# Patient Record
Sex: FEMALE | Race: WHITE | NOT HISPANIC OR LATINO | Employment: FULL TIME | URBAN - METROPOLITAN AREA
[De-identification: names, ages, dates, MRNs, and addresses within clinical notes are randomized per-mention and may not be internally consistent; named-entity substitution may affect disease eponyms.]

---

## 2018-01-15 ENCOUNTER — APPOINTMENT (OUTPATIENT)
Dept: WOMENS IMAGING | Facility: HOSPITAL | Age: 43
End: 2018-01-15

## 2018-01-15 PROCEDURE — 77067 SCR MAMMO BI INCL CAD: CPT | Performed by: RADIOLOGY

## 2018-06-05 ENCOUNTER — OFFICE VISIT (OUTPATIENT)
Dept: INTERNAL MEDICINE | Facility: CLINIC | Age: 43
End: 2018-06-05

## 2018-06-05 VITALS
SYSTOLIC BLOOD PRESSURE: 114 MMHG | TEMPERATURE: 98.7 F | HEART RATE: 73 BPM | OXYGEN SATURATION: 99 % | HEIGHT: 62 IN | WEIGHT: 160.2 LBS | DIASTOLIC BLOOD PRESSURE: 68 MMHG | BODY MASS INDEX: 29.48 KG/M2

## 2018-06-05 DIAGNOSIS — Z00.00 HEALTHCARE MAINTENANCE: Primary | ICD-10-CM

## 2018-06-05 DIAGNOSIS — G47.00 INSOMNIA, UNSPECIFIED TYPE: ICD-10-CM

## 2018-06-05 PROCEDURE — 99396 PREV VISIT EST AGE 40-64: CPT | Performed by: NURSE PRACTITIONER

## 2018-06-05 RX ORDER — LEVONORGESTREL AND ETHINYL ESTRADIOL 150-30(84)
KIT ORAL
COMMUNITY
Start: 2018-05-23 | End: 2021-08-20

## 2018-06-05 NOTE — PROGRESS NOTES
"Subjective   Timoteo Spear is a 43 y.o. female and is here for a comprehensive physical exam. New patient here to establish care.  Health history and questionnaire have been reviewed in its entirety. The patient's previous primary care provider was Alisa KIRBY  The patient reports fatigue.  States that she gets about 7-8 hours of sleep a night, but does not feel rested.  She has worn a fit that in the past, which shows that she is a restless sleeper.  She is not sure if she snores.    Do you take any herbs or supplements that were not prescribed by a doctor? calcium, daily vitamin, fish oil  Are you taking aspirin daily? no     History:  Patient sees gynecology. Last pap and mammogram were in Oct 2017    The following portions of the patient's history were reviewed and updated as appropriate: allergies, current medications, past family history, past medical history, past social history, past surgical history and problem list.    Review of Systems  Do you have pain that bothers you in your daily life? no  Pertinent items are noted in HPI.    Objective   /68 (BP Location: Left arm, Patient Position: Sitting)   Pulse 73   Temp 98.7 °F (37.1 °C)   Ht 157.5 cm (62\")   Wt 72.7 kg (160 lb 3.2 oz)   SpO2 99%   BMI 29.30 kg/m²     General Appearance:    Alert, cooperative, no distress, appears stated age   Head:    Normocephalic, without obvious abnormality, atraumatic   Eyes:    PERRL, conjunctiva/corneas clear, EOM's intact, fundi     benign, both eyes   Ears:    Normal TM's and external ear canals, both ears   Nose:   Nares normal, septum midline, mucosa normal, no drainage    or sinus tenderness   Throat:   Lips, mucosa, and tongue normal; teeth and gums normal   Neck:   Supple, symmetrical, trachea midline, no adenopathy;     thyroid:  no enlargement/tenderness/nodules; no carotid    bruit or JVD   Back:     Symmetric, no curvature, ROM normal, no CVA tenderness   Lungs:     Clear to auscultation " bilaterally, respirations unlabored   Chest Wall:    No tenderness or deformity    Heart:    Regular rate and rhythm, S1 and S2 normal, no murmur, rub   or gallop       Abdomen:     Soft, non-tender, bowel sounds active all four quadrants,     no masses, no organomegaly           Extremities:   Extremities normal, atraumatic, no cyanosis or edema   Pulses:   2+ and symmetric all extremities   Skin:   Skin color, texture, turgor normal, no rashes or lesions   Lymph nodes:   Cervical, supraclavicular, and axillary nodes normal   Neurologic:   CNII-XII intact, normal strength, sensation and reflexes     throughout        Assessment/Plan   Healthy female exam.       1. Timoteo was seen today for annual exam.    Diagnoses and all orders for this visit:    Healthcare maintenance  -     CBC & Differential  -     Comprehensive Metabolic Panel  -     Lipid Panel With / Chol / HDL Ratio  -     TSH  -     Vitamin D 25 Hydroxy    Insomnia, unspecified type  -     Ambulatory Referral to Sleep Medicine        2. Patient Counseling:  --Nutrition: Stressed importance of moderation in sodium/caffeine intake, saturated fat and cholesterol, caloric balance, sufficient intake of fresh fruits, vegetables, fiber, calcium, iron.   --Exercise: Stressed the importance of regular exercise. Run/jog; barre   --Injury prevention: Discussed safety belts, safety helmets, smoke detector.   --Dental health: Discussed importance of regular tooth brushing, flossing, and dental visits. Dr. Sanchez  --Immunizations reviewed.    3. Discussed the patient's BMI with her.  The BMI is above average; BMI management plan is completed  4. Follow up based on lab results

## 2018-06-11 LAB
25(OH)D3+25(OH)D2 SERPL-MCNC: 37.1 NG/ML (ref 30–100)
ALBUMIN SERPL-MCNC: 4.3 G/DL (ref 3.5–5.2)
ALBUMIN/GLOB SERPL: 1.3 G/DL
ALP SERPL-CCNC: 46 U/L (ref 39–117)
ALT SERPL-CCNC: 9 U/L (ref 1–33)
AST SERPL-CCNC: 21 U/L (ref 1–32)
BASOPHILS # BLD AUTO: 0.04 10*3/MM3 (ref 0–0.2)
BASOPHILS NFR BLD AUTO: 0.5 % (ref 0–1.5)
BILIRUB SERPL-MCNC: 0.4 MG/DL (ref 0.1–1.2)
BUN SERPL-MCNC: 15 MG/DL (ref 6–20)
BUN/CREAT SERPL: 17 (ref 7–25)
CALCIUM SERPL-MCNC: 10.2 MG/DL (ref 8.6–10.5)
CHLORIDE SERPL-SCNC: 98 MMOL/L (ref 98–107)
CHOLEST SERPL-MCNC: 199 MG/DL (ref 0–200)
CHOLEST/HDLC SERPL: 4.52 {RATIO}
CO2 SERPL-SCNC: 24.2 MMOL/L (ref 22–29)
CREAT SERPL-MCNC: 0.88 MG/DL (ref 0.57–1)
EOSINOPHIL # BLD AUTO: 0.38 10*3/MM3 (ref 0–0.7)
EOSINOPHIL NFR BLD AUTO: 5 % (ref 0.3–6.2)
ERYTHROCYTE [DISTWIDTH] IN BLOOD BY AUTOMATED COUNT: 12.9 % (ref 11.7–13)
GFR SERPLBLD CREATININE-BSD FMLA CKD-EPI: 69 ML/MIN/1.73
GFR SERPLBLD CREATININE-BSD FMLA CKD-EPI: 84 ML/MIN/1.73
GLOBULIN SER CALC-MCNC: 3.2 GM/DL
GLUCOSE SERPL-MCNC: 76 MG/DL (ref 65–99)
HCT VFR BLD AUTO: 41.3 % (ref 35.6–45.5)
HDLC SERPL-MCNC: 44 MG/DL (ref 40–60)
HGB BLD-MCNC: 13.3 G/DL (ref 11.9–15.5)
IMM GRANULOCYTES # BLD: 0 10*3/MM3 (ref 0–0.03)
IMM GRANULOCYTES NFR BLD: 0 % (ref 0–0.5)
LDLC SERPL CALC-MCNC: 127 MG/DL (ref 0–100)
LYMPHOCYTES # BLD AUTO: 2.32 10*3/MM3 (ref 0.9–4.8)
LYMPHOCYTES NFR BLD AUTO: 30.5 % (ref 19.6–45.3)
MCH RBC QN AUTO: 32 PG (ref 26.9–32)
MCHC RBC AUTO-ENTMCNC: 32.2 G/DL (ref 32.4–36.3)
MCV RBC AUTO: 99.3 FL (ref 80.5–98.2)
MONOCYTES # BLD AUTO: 0.56 10*3/MM3 (ref 0.2–1.2)
MONOCYTES NFR BLD AUTO: 7.4 % (ref 5–12)
NEUTROPHILS # BLD AUTO: 4.31 10*3/MM3 (ref 1.9–8.1)
NEUTROPHILS NFR BLD AUTO: 56.6 % (ref 42.7–76)
PLATELET # BLD AUTO: 390 10*3/MM3 (ref 140–500)
POTASSIUM SERPL-SCNC: 4.6 MMOL/L (ref 3.5–5.2)
PROT SERPL-MCNC: 7.5 G/DL (ref 6–8.5)
RBC # BLD AUTO: 4.16 10*6/MM3 (ref 3.9–5.2)
SODIUM SERPL-SCNC: 139 MMOL/L (ref 136–145)
TRIGL SERPL-MCNC: 139 MG/DL (ref 0–150)
TSH SERPL DL<=0.005 MIU/L-ACNC: 3.61 MIU/ML (ref 0.27–4.2)
VLDLC SERPL CALC-MCNC: 27.8 MG/DL (ref 5–40)
WBC # BLD AUTO: 7.61 10*3/MM3 (ref 4.5–10.7)

## 2019-01-25 ENCOUNTER — APPOINTMENT (OUTPATIENT)
Dept: WOMENS IMAGING | Facility: HOSPITAL | Age: 44
End: 2019-01-25

## 2019-01-25 PROCEDURE — 77063 BREAST TOMOSYNTHESIS BI: CPT | Performed by: RADIOLOGY

## 2019-01-25 PROCEDURE — 77067 SCR MAMMO BI INCL CAD: CPT | Performed by: RADIOLOGY

## 2019-06-21 DIAGNOSIS — E55.9 VITAMIN D DEFICIENCY: ICD-10-CM

## 2019-06-21 DIAGNOSIS — Z00.00 HEALTHCARE MAINTENANCE: Primary | ICD-10-CM

## 2019-06-28 LAB
25(OH)D3+25(OH)D2 SERPL-MCNC: 33.9 NG/ML (ref 30–100)
ALBUMIN SERPL-MCNC: 4.3 G/DL (ref 3.5–5.2)
ALBUMIN/GLOB SERPL: 1.3 G/DL
ALP SERPL-CCNC: 51 U/L (ref 39–117)
ALT SERPL-CCNC: 8 U/L (ref 1–33)
AST SERPL-CCNC: 14 U/L (ref 1–32)
BASOPHILS # BLD AUTO: 0.06 10*3/MM3 (ref 0–0.2)
BASOPHILS NFR BLD AUTO: 0.8 % (ref 0–1.5)
BILIRUB SERPL-MCNC: <0.2 MG/DL (ref 0.2–1.2)
BUN SERPL-MCNC: 15 MG/DL (ref 6–20)
BUN/CREAT SERPL: 20.5 (ref 7–25)
CALCIUM SERPL-MCNC: 9.6 MG/DL (ref 8.6–10.5)
CHLORIDE SERPL-SCNC: 102 MMOL/L (ref 98–107)
CHOLEST SERPL-MCNC: 195 MG/DL (ref 0–200)
CHOLEST/HDLC SERPL: 4.24 {RATIO}
CO2 SERPL-SCNC: 26.2 MMOL/L (ref 22–29)
CREAT SERPL-MCNC: 0.73 MG/DL (ref 0.57–1)
EOSINOPHIL # BLD AUTO: 0.32 10*3/MM3 (ref 0–0.4)
EOSINOPHIL NFR BLD AUTO: 4.4 % (ref 0.3–6.2)
ERYTHROCYTE [DISTWIDTH] IN BLOOD BY AUTOMATED COUNT: 12.5 % (ref 12.3–15.4)
GLOBULIN SER CALC-MCNC: 3.2 GM/DL
GLUCOSE SERPL-MCNC: 88 MG/DL (ref 65–99)
HCT VFR BLD AUTO: 42.2 % (ref 34–46.6)
HDLC SERPL-MCNC: 46 MG/DL (ref 40–60)
HGB BLD-MCNC: 13.2 G/DL (ref 12–15.9)
IMM GRANULOCYTES # BLD AUTO: 0.01 10*3/MM3 (ref 0–0.05)
IMM GRANULOCYTES NFR BLD AUTO: 0.1 % (ref 0–0.5)
LDLC SERPL CALC-MCNC: 128 MG/DL (ref 0–100)
LYMPHOCYTES # BLD AUTO: 2.28 10*3/MM3 (ref 0.7–3.1)
LYMPHOCYTES NFR BLD AUTO: 31.4 % (ref 19.6–45.3)
MCH RBC QN AUTO: 31.4 PG (ref 26.6–33)
MCHC RBC AUTO-ENTMCNC: 31.3 G/DL (ref 31.5–35.7)
MCV RBC AUTO: 100.2 FL (ref 79–97)
MONOCYTES # BLD AUTO: 0.47 10*3/MM3 (ref 0.1–0.9)
MONOCYTES NFR BLD AUTO: 6.5 % (ref 5–12)
NEUTROPHILS # BLD AUTO: 4.11 10*3/MM3 (ref 1.7–7)
NEUTROPHILS NFR BLD AUTO: 56.8 % (ref 42.7–76)
NRBC BLD AUTO-RTO: 0 /100 WBC (ref 0–0.2)
PLATELET # BLD AUTO: 382 10*3/MM3 (ref 140–450)
POTASSIUM SERPL-SCNC: 5 MMOL/L (ref 3.5–5.2)
PROT SERPL-MCNC: 7.5 G/DL (ref 6–8.5)
RBC # BLD AUTO: 4.21 10*6/MM3 (ref 3.77–5.28)
SODIUM SERPL-SCNC: 139 MMOL/L (ref 136–145)
TRIGL SERPL-MCNC: 103 MG/DL (ref 0–150)
VLDLC SERPL CALC-MCNC: 20.6 MG/DL
WBC # BLD AUTO: 7.25 10*3/MM3 (ref 3.4–10.8)

## 2019-07-11 ENCOUNTER — OFFICE VISIT (OUTPATIENT)
Dept: INTERNAL MEDICINE | Facility: CLINIC | Age: 44
End: 2019-07-11

## 2019-07-11 VITALS
BODY MASS INDEX: 30.18 KG/M2 | OXYGEN SATURATION: 99 % | DIASTOLIC BLOOD PRESSURE: 78 MMHG | WEIGHT: 164 LBS | HEART RATE: 72 BPM | SYSTOLIC BLOOD PRESSURE: 112 MMHG | HEIGHT: 62 IN

## 2019-07-11 DIAGNOSIS — Z00.00 HEALTHCARE MAINTENANCE: Primary | ICD-10-CM

## 2019-07-11 DIAGNOSIS — R53.82 CHRONIC FATIGUE: ICD-10-CM

## 2019-07-11 PROCEDURE — 99396 PREV VISIT EST AGE 40-64: CPT | Performed by: NURSE PRACTITIONER

## 2019-07-11 NOTE — PROGRESS NOTES
"Subjective   Timoteo Spear is a 44 y.o. female and is here for a comprehensive physical exam. The patient reports :.    C/o diarrhea x 2 weeks off and on - about 5 times in the last 2 weeks. Denies abdominal pain. She has a history of colitis about 15 years ago. Denies any changes in her diet. She does have some increased stress at work. Denies any recent travel or sick contacts. She hasn't tried anything for her symptoms. She really thinks that it is related to stress.     C/o fatigue. Tried magnesium which helped her sleep a little, but still didn't feel rested. This has been an ongoing issue for her. She had a sleep study last year which was normal.     Do you take any herbs or supplements that were not prescribed by a doctor? no     History:  Patient sees gynecology every 6 months. Uterine fibroids, some endometriosis.     The following portions of the patient's history were reviewed and updated as appropriate: allergies, current medications, past family history, past medical history, past social history, past surgical history and problem list.    Review of Systems  Do you have pain that bothers you in your daily life? no  Pertinent items are noted in HPI.    Objective   /78 (BP Location: Left arm, Patient Position: Sitting, Cuff Size: Adult)   Pulse 72   Ht 157.5 cm (62\")   Wt 74.4 kg (164 lb)   SpO2 99%   BMI 30.00 kg/m²     General Appearance:    Alert, cooperative, no distress, appears stated age   Head:    Normocephalic, without obvious abnormality, atraumatic   Eyes:    PERRL, conjunctiva/corneas clear, EOM's intact, both eyes   Ears:    Normal TM's and external ear canals, both ears   Nose:   Nares normal, septum midline, mucosa normal, no drainage    or sinus tenderness   Throat:   Lips, mucosa, and tongue normal; teeth and gums normal   Neck:   Supple, symmetrical, trachea midline, no adenopathy;     thyroid:  no enlargement/tenderness/nodules; no carotid    bruit   Back:     Symmetric, no " curvature, ROM normal, no CVA tenderness   Lungs:     Clear to auscultation bilaterally, respirations unlabored   Chest Wall:    No tenderness or deformity    Heart:    Regular rate and rhythm, S1 and S2 normal, no murmur       Abdomen:     Soft, non-tender, bowel sounds active all four quadrants,     no masses, no organomegaly           Extremities:   Extremities normal, atraumatic, no cyanosis or edema   Pulses:   2+ and symmetric all extremities   Skin:   Skin color, texture, turgor normal, no rashes or lesions   Lymph nodes:   Cervical, supraclavicular, and axillary nodes normal   Neurologic:   Grossly intact, normal strength, sensation and reflexes     throughout     Office Visit on 07/11/2019   Component Date Value Ref Range Status   • Vitamin B-12 07/11/2019 583  211 - 946 pg/mL Final   Orders Only on 06/21/2019   Component Date Value Ref Range Status   • Glucose 06/27/2019 88  65 - 99 mg/dL Final   • BUN 06/27/2019 15  6 - 20 mg/dL Final   • Creatinine 06/27/2019 0.73  0.57 - 1.00 mg/dL Final   • eGFR Non  Am 06/27/2019 86  >60 mL/min/1.73 Final   • eGFR African Am 06/27/2019 104  >60 mL/min/1.73 Final   • BUN/Creatinine Ratio 06/27/2019 20.5  7.0 - 25.0 Final   • Sodium 06/27/2019 139  136 - 145 mmol/L Final   • Potassium 06/27/2019 5.0  3.5 - 5.2 mmol/L Final   • Chloride 06/27/2019 102  98 - 107 mmol/L Final   • Total CO2 06/27/2019 26.2  22.0 - 29.0 mmol/L Final   • Calcium 06/27/2019 9.6  8.6 - 10.5 mg/dL Final   • Total Protein 06/27/2019 7.5  6.0 - 8.5 g/dL Final   • Albumin 06/27/2019 4.30  3.50 - 5.20 g/dL Final   • Globulin 06/27/2019 3.2  gm/dL Final   • A/G Ratio 06/27/2019 1.3  g/dL Final   • Total Bilirubin 06/27/2019 <0.2* 0.2 - 1.2 mg/dL Final   • Alkaline Phosphatase 06/27/2019 51  39 - 117 U/L Final   • AST (SGOT) 06/27/2019 14  1 - 32 U/L Final   • ALT (SGPT) 06/27/2019 8  1 - 33 U/L Final   • Total Cholesterol 06/27/2019 195  0 - 200 mg/dL Final   • Triglycerides 06/27/2019 103  0  - 150 mg/dL Final   • HDL Cholesterol 06/27/2019 46  40 - 60 mg/dL Final   • VLDL Cholesterol 06/27/2019 20.6  mg/dL Final   • LDL Cholesterol  06/27/2019 128* 0 - 100 mg/dL Final   • Chol/HDL Ratio 06/27/2019 4.24   Final   • WBC 06/27/2019 7.25  3.40 - 10.80 10*3/mm3 Final   • RBC 06/27/2019 4.21  3.77 - 5.28 10*6/mm3 Final   • Hemoglobin 06/27/2019 13.2  12.0 - 15.9 g/dL Final   • Hematocrit 06/27/2019 42.2  34.0 - 46.6 % Final   • MCV 06/27/2019 100.2* 79.0 - 97.0 fL Final   • MCH 06/27/2019 31.4  26.6 - 33.0 pg Final   • MCHC 06/27/2019 31.3* 31.5 - 35.7 g/dL Final   • RDW 06/27/2019 12.5  12.3 - 15.4 % Final   • Platelets 06/27/2019 382  140 - 450 10*3/mm3 Final   • Neutrophil Rel % 06/27/2019 56.8  42.7 - 76.0 % Final   • Lymphocyte Rel % 06/27/2019 31.4  19.6 - 45.3 % Final   • Monocyte Rel % 06/27/2019 6.5  5.0 - 12.0 % Final   • Eosinophil Rel % 06/27/2019 4.4  0.3 - 6.2 % Final   • Basophil Rel % 06/27/2019 0.8  0.0 - 1.5 % Final   • Neutrophils Absolute 06/27/2019 4.11  1.70 - 7.00 10*3/mm3 Final   • Lymphocytes Absolute 06/27/2019 2.28  0.70 - 3.10 10*3/mm3 Final   • Monocytes Absolute 06/27/2019 0.47  0.10 - 0.90 10*3/mm3 Final   • Eosinophils Absolute 06/27/2019 0.32  0.00 - 0.40 10*3/mm3 Final   • Basophils Absolute 06/27/2019 0.06  0.00 - 0.20 10*3/mm3 Final   • Immature Granulocyte Rel % 06/27/2019 0.1  0.0 - 0.5 % Final   • Immature Grans Absolute 06/27/2019 0.01  0.00 - 0.05 10*3/mm3 Final   • nRBC 06/27/2019 0.0  0.0 - 0.2 /100 WBC Final   • 25 Hydroxy, Vitamin D 06/27/2019 33.9  30.0 - 100.0 ng/ml Final    Comment: Reference Range for Total Vitamin D 25(OH)  Deficiency <20.0 ng/mL  Insufficiency 21-29 ng/mL  Sufficiency  ng/mL  Toxicity >100 ng/ml       Reviewed labs with patient.        Assessment/Plan   Healthy female exam.      1. Timoteo was seen today for annual exam, fatigue and ulcerative colitis.    Diagnoses and all orders for this visit:    Healthcare maintenance    Chronic  fatigue  -     Vitamin B12    fatigue - will check vit B12    Diarrhea - Patient states that she is starting to feel better. She will let us know if she would like a referral to GI.    2. Patient Counseling:  --Nutrition: Stressed importance of moderation in sodium/caffeine intake, saturated fat and cholesterol, caloric balance, sufficient intake of fresh fruits, vegetables, fiber, calcium, iron.   --Exercise: Stressed the importance of regular exercise. [employer] 2-3 times per week  --Injury prevention: Discussed safety belts, safety helmets, smoke detector.   --Dental health: Discussed importance of regular tooth brushing, flossing, and dental visits.  --Immunizations reviewed.    3. Discussed the patient's BMI with her.  The BMI is above average; BMI management plan is completed  4. Follow up in one year for physical.

## 2019-07-12 LAB — VIT B12 SERPL-MCNC: 583 PG/ML (ref 211–946)

## 2019-08-13 ENCOUNTER — TELEPHONE (OUTPATIENT)
Dept: INTERNAL MEDICINE | Facility: CLINIC | Age: 44
End: 2019-08-13

## 2019-08-13 NOTE — TELEPHONE ENCOUNTER
----- Message from KOFI Guillen sent at 8/12/2019  3:08 PM EDT -----  Contact: [phone]  If she is wanting to take hormone replacement therapy, I would recommend [facility]   They will give her a list of labs that they want to check and make a formula specific for her.   Otherwise, I can check a FSH and LH, but it will just let me know if she is in menopause. I can't check these if she is currently on hormone replacement therapy    ----- Message -----  From: Nancy Barrera MA  Sent: 8/9/2019   4:27 PM  To: KOFI Guillen    She mentioned being checked for menopause since she is approach that age range. She wants to know what you would think about this & what/if you would order any labs. I told her that you have before on other patients but at their request. She wanted to see if you agree on this or just going with the sleep route.     ----- Message -----  From: Tiffanie Carpenter APRN  Sent: 8/8/2019  12:06 PM  To: Nancy Barrera MA    I would refer her to sleep medicine. She had a normal sleep study. They may have other suggestions for her.     ----- Message -----  From: Nancy Barrera MA  Sent: 8/8/2019  11:19 AM  To: KOFI Guillen    Please advise on fatigue.     ----- Message -----  From: Clarissa Rios RegSched Rep  Sent: 8/8/2019  10:04 AM  To: Nancy Barrera MA    Pt called because she is still experiencing chronic fatigue and wants to know what the next steps are. She did her VIT B test which came back normal and she just doesn't know what to do from here

## 2019-08-29 ENCOUNTER — TELEPHONE (OUTPATIENT)
Dept: INTERNAL MEDICINE | Facility: CLINIC | Age: 44
End: 2019-08-29

## 2019-08-29 DIAGNOSIS — R53.82 CHRONIC FATIGUE: Primary | ICD-10-CM

## 2019-08-29 NOTE — TELEPHONE ENCOUNTER
----- Message from KOFI Guillen sent at 8/29/2019  2:50 PM EDT -----  FSH and LH    ----- Message -----  From: Nancy Barrera MA  Sent: 8/29/2019   2:29 PM  To: KOFI Guillen    I just scheduled this patient to get her hormone labs checked. She will officially be off of her BCP for 4 weeks on 9/09/19-please advise on which labs you'd like to check on her to see if she is in menopause.

## 2019-09-06 DIAGNOSIS — R53.82 CHRONIC FATIGUE: ICD-10-CM

## 2019-09-12 LAB
FSH SERPL-ACNC: 3.2 MIU/ML
LH SERPL-ACNC: 2.5 MIU/ML

## 2020-02-04 ENCOUNTER — APPOINTMENT (OUTPATIENT)
Dept: WOMENS IMAGING | Facility: HOSPITAL | Age: 45
End: 2020-02-04

## 2020-02-04 PROCEDURE — 77067 SCR MAMMO BI INCL CAD: CPT | Performed by: RADIOLOGY

## 2021-02-23 ENCOUNTER — APPOINTMENT (OUTPATIENT)
Dept: WOMENS IMAGING | Facility: HOSPITAL | Age: 46
End: 2021-02-23

## 2021-02-23 PROCEDURE — 77063 BREAST TOMOSYNTHESIS BI: CPT | Performed by: RADIOLOGY

## 2021-02-23 PROCEDURE — 77067 SCR MAMMO BI INCL CAD: CPT | Performed by: RADIOLOGY

## 2021-04-02 ENCOUNTER — BULK ORDERING (OUTPATIENT)
Dept: CASE MANAGEMENT | Facility: OTHER | Age: 46
End: 2021-04-02

## 2021-04-02 DIAGNOSIS — Z23 IMMUNIZATION DUE: ICD-10-CM

## 2021-08-20 ENCOUNTER — HOSPITAL ENCOUNTER (OUTPATIENT)
Dept: GENERAL RADIOLOGY | Facility: HOSPITAL | Age: 46
Discharge: HOME | End: 2021-08-20
Admitting: NURSE PRACTITIONER

## 2021-08-20 ENCOUNTER — OFFICE VISIT (OUTPATIENT)
Dept: INTERNAL MEDICINE | Facility: CLINIC | Age: 46
End: 2021-08-20

## 2021-08-20 VITALS
HEART RATE: 79 BPM | TEMPERATURE: 97.8 F | HEIGHT: 61 IN | SYSTOLIC BLOOD PRESSURE: 126 MMHG | WEIGHT: 167.5 LBS | DIASTOLIC BLOOD PRESSURE: 80 MMHG | OXYGEN SATURATION: 98 % | BODY MASS INDEX: 31.63 KG/M2

## 2021-08-20 DIAGNOSIS — Z00.00 HEALTHCARE MAINTENANCE: Primary | ICD-10-CM

## 2021-08-20 DIAGNOSIS — Z12.11 COLON CANCER SCREENING: ICD-10-CM

## 2021-08-20 DIAGNOSIS — G89.29 CHRONIC PAIN OF RIGHT KNEE: ICD-10-CM

## 2021-08-20 DIAGNOSIS — M25.562 CHRONIC PAIN OF LEFT KNEE: ICD-10-CM

## 2021-08-20 DIAGNOSIS — G89.29 CHRONIC PAIN OF LEFT KNEE: ICD-10-CM

## 2021-08-20 DIAGNOSIS — R19.7 DIARRHEA, UNSPECIFIED TYPE: ICD-10-CM

## 2021-08-20 DIAGNOSIS — M25.561 CHRONIC PAIN OF RIGHT KNEE: ICD-10-CM

## 2021-08-20 DIAGNOSIS — R53.82 CHRONIC FATIGUE: ICD-10-CM

## 2021-08-20 PROBLEM — K52.9 COLITIS: Status: ACTIVE | Noted: 2021-08-20

## 2021-08-20 PROCEDURE — 73562 X-RAY EXAM OF KNEE 3: CPT

## 2021-08-20 PROCEDURE — 99396 PREV VISIT EST AGE 40-64: CPT | Performed by: NURSE PRACTITIONER

## 2021-08-20 PROCEDURE — 90715 TDAP VACCINE 7 YRS/> IM: CPT | Performed by: NURSE PRACTITIONER

## 2021-08-20 PROCEDURE — 99213 OFFICE O/P EST LOW 20 MIN: CPT | Performed by: NURSE PRACTITIONER

## 2021-08-20 PROCEDURE — 90471 IMMUNIZATION ADMIN: CPT | Performed by: NURSE PRACTITIONER

## 2021-08-20 RX ORDER — CLOBETASOL PROPIONATE 0.5 MG/G
CREAM TOPICAL
COMMUNITY
Start: 2021-08-03

## 2021-08-20 NOTE — PROGRESS NOTES
"Subjective   Timoteo Spear is a 46 y.o. female and is here for a comprehensive physical exam. The patient reports :.    Do you take any herbs or supplements that were not prescribed by a doctor? Probiotic, calcium, vit D, multivitamin, magnesium     C/o abdominal bloating for months associated with loose stools, stomach pain. Thinks that it may be stress related. Almost 20 years ago had a colonoscopy.  States that she has tried to cut out dairy and wheat and notices that her stomach bloating is somewhat better by cutting those out.  She also has a sensitivity to egg whites.  States that she can eat them occasionally but not very often    Complains of not sleeping well.  States that she falls asleep easily and then wakes up due to right knee pain.  States that her right knee pain is getting worse. Knee is achy in the middle of the night. Advil helps some. Feels like it pops every once in awhile.  Thinks that she may have injured it as a child.  She has not tried anything specifically for sleep.  Did have a sleep study a couple of years ago which was normal     History:  Hysterectomy 2020    The following portions of the patient's history were reviewed and updated as appropriate: allergies, current medications, past family history, past medical history, past social history, past surgical history and problem list.    Review of Systems  Do you have pain that bothers you in your daily life? Right knee pain  Pertinent items are noted in HPI.    Objective   /80 (BP Location: Left arm, Patient Position: Sitting, Cuff Size: Adult)   Pulse 79   Temp 97.8 °F (36.6 °C)   Ht 154.9 cm (61\")   Wt 76 kg (167 lb 8 oz)   SpO2 98%   BMI 31.65 kg/m²     General Appearance:    Alert, cooperative, no distress, appears stated age   Head:    Normocephalic, without obvious abnormality, atraumatic   Eyes:    PERRL, conjunctiva/corneas clear, EOM's intact, both eyes   Ears:    Normal TM's and external ear canals, both ears "   Nose:   Nares normal, septum midline, mucosa normal, no drainage    or sinus tenderness   Throat:   Lips, mucosa, and tongue normal; teeth and gums normal   Neck:   Supple, symmetrical, trachea midline, no adenopathy;     thyroid:  no enlargement/tenderness/nodules; no carotid    bruit   Back:     Symmetric, no curvature, ROM normal, no CVA tenderness   Lungs:     Clear to auscultation bilaterally, respirations unlabored   Chest Wall:    No tenderness or deformity    Heart:    Regular rate and rhythm, S1 and S2 normal, no murmur       Abdomen:     Soft, non-tender, bowel sounds active all four quadrants,     no masses, no organomegaly           Extremities:   Extremities normal, atraumatic, no cyanosis or edema   Pulses:   2+ and symmetric all extremities   Skin:   Skin color, texture, turgor normal, no rashes or lesions   Lymph nodes:   Cervical, supraclavicular, and axillary nodes normal   Neurologic:   Grossly intact, normal strength, sensation and reflexes     throughout        Assessment/Plan   Healthy female exam.      1. Diagnoses and all orders for this visit:    1. Healthcare maintenance (Primary)  -     CBC & Differential; Future  -     Comprehensive Metabolic Panel; Future  -     Lipid Panel With / Chol / HDL Ratio; Future  -     Vitamin D 25 Hydroxy; Future    2. Diarrhea, unspecified type  -     Ambulatory Referral to Gastroenterology    3. Colon cancer screening  -     Ambulatory Referral to Gastroenterology    4. Chronic pain of left knee  -     Cancel: XR Knee 3 View Left; Future  -     Ambulatory Referral to Orthopedic Surgery    5. Chronic fatigue  -     CBC & Differential; Future  -     TSH; Future  -     T4, Free; Future  -     Vitamin B12; Future  -     Iron and TIBC; Future  -     Ferritin; Future    6. Chronic pain of right knee  -     XR Knee 3 View Right    Other orders  -     Tdap Vaccine Greater Than or Equal To 8yo IM      Patient will return for fasting labs    Referral to GI for  colonoscopy and evaluation for her abdominal bloating and diarrhea.    Will refer to Ortho and get a right knee x-ray due to her chronic right knee pain    2. Patient Counseling:  --Nutrition: Stressed importance of moderation in sodium/caffeine intake, saturated fat and cholesterol, caloric balance, sufficient intake of fresh fruits, vegetables, fiber, calcium, iron. Paleo helps with her stomach - no dairy, wheat. Food sensitivity test - egg whites  --Exercise: Stressed the importance of regular exercise.    --Injury prevention: Discussed safety belts, safety helmets, smoke detector.   --Dental health: Discussed importance of regular tooth brushing, flossing, and dental visits.   --Immunizations reviewed.    3. Follow up based on labs

## 2021-08-26 RX ORDER — LEVOTHYROXINE SODIUM 0.03 MG/1
25 TABLET ORAL DAILY
Qty: 30 TABLET | Refills: 2 | Status: SHIPPED | OUTPATIENT
Start: 2021-08-26 | End: 2021-11-11 | Stop reason: SDUPTHER

## 2021-10-20 DIAGNOSIS — R94.6 ABNORMAL RESULTS OF THYROID FUNCTION STUDIES: Primary | ICD-10-CM

## 2021-10-22 LAB
T4 FREE SERPL-MCNC: 1.09 NG/DL (ref 0.82–1.77)
TSH SERPL DL<=0.005 MIU/L-ACNC: 2.43 UIU/ML (ref 0.45–4.5)

## 2021-11-09 ENCOUNTER — TELEPHONE (OUTPATIENT)
Dept: INTERNAL MEDICINE | Facility: CLINIC | Age: 46
End: 2021-11-09

## 2021-11-09 NOTE — TELEPHONE ENCOUNTER
Caller: Timoteo Spear    Relationship: Self    Best call back number: [phone] (H)    What is the best time to reach you: ANY TIME     Who are you requesting to speak with (clinical staff, provider,  specific staff member): ROSMERY    What was the call regarding:     PATIENT IS REQUESTING A CALL BACK FROM ROSMERY, TO DISCUSS CONTINUING HER THYROID MEDICATION.

## 2021-11-11 ENCOUNTER — OFFICE VISIT (OUTPATIENT)
Dept: INTERNAL MEDICINE | Facility: CLINIC | Age: 46
End: 2021-11-11

## 2021-11-11 VITALS
HEART RATE: 72 BPM | SYSTOLIC BLOOD PRESSURE: 114 MMHG | OXYGEN SATURATION: 98 % | HEIGHT: 61 IN | WEIGHT: 167 LBS | DIASTOLIC BLOOD PRESSURE: 60 MMHG | BODY MASS INDEX: 31.53 KG/M2

## 2021-11-11 DIAGNOSIS — E03.9 HYPOTHYROIDISM, UNSPECIFIED TYPE: Primary | ICD-10-CM

## 2021-11-11 PROCEDURE — 99213 OFFICE O/P EST LOW 20 MIN: CPT | Performed by: NURSE PRACTITIONER

## 2021-11-11 RX ORDER — LEVOTHYROXINE SODIUM 0.05 MG/1
50 TABLET ORAL DAILY
Qty: 90 TABLET | Refills: 0 | Status: SHIPPED | OUTPATIENT
Start: 2021-11-11 | End: 2022-05-06 | Stop reason: SDUPTHER

## 2021-11-11 NOTE — PROGRESS NOTES
Subjective   Timoteo Spear is a 46 y.o. female. Patient is here today for   Chief Complaint   Patient presents with   • Hypothyroidism     Pt here to follow up on thyroid. Pt complains of having fatigue again.    .    History of Present Illness   Patient is here to follow up on hypothyroidism. Her fatigue had improved when she first started taking the medication, but recently she is starting with some fatigue again. States that her muscle aches in her legs have improved.     The following portions of the patient's history were reviewed and updated as appropriate: allergies, current medications, past family history, past medical history, past social history, past surgical history and problem list.    Review of Systems    Objective   Vitals:    11/11/21 0916   BP: 114/60   Pulse: 72   SpO2: 98%     Body mass index is 31.55 kg/m².     No visits with results within 3 Week(s) from this visit.   Latest known visit with results is:   Orders Only on 10/20/2021   Component Date Value Ref Range Status   • TSH 10/21/2021 2.430  0.450 - 4.500 uIU/mL Final   • Free T4 10/21/2021 1.09  0.82 - 1.77 ng/dL Final     Reviewed labs with patient.     Physical Exam  Vitals and nursing note reviewed.   Constitutional:       Appearance: Normal appearance. She is well-developed.   Neck:      Thyroid: No thyroid mass or thyromegaly.   Cardiovascular:      Rate and Rhythm: Normal rate and regular rhythm.      Heart sounds: Normal heart sounds.   Pulmonary:      Effort: Pulmonary effort is normal.      Breath sounds: Normal breath sounds.   Skin:     General: Skin is warm and dry.   Neurological:      Mental Status: She is alert and oriented to person, place, and time.   Psychiatric:         Speech: Speech normal.         Behavior: Behavior normal.         Thought Content: Thought content normal.         Assessment/Plan   Diagnoses and all orders for this visit:    1. Hypothyroidism, unspecified type (Primary)  -     levothyroxine (SYNTHROID,  LEVOTHROID) 50 MCG tablet; Take 1 tablet by mouth Daily.  Dispense: 90 tablet; Refill: 0  -     TSH; Future  -     T4, Free; Future    Will increase levothyroxine to 50 mcg to get a TSH between 1-2. Recheck labs in 6-8 weeks

## 2022-03-22 ENCOUNTER — APPOINTMENT (OUTPATIENT)
Dept: WOMENS IMAGING | Facility: HOSPITAL | Age: 47
End: 2022-03-22

## 2022-03-22 PROCEDURE — 77063 BREAST TOMOSYNTHESIS BI: CPT | Performed by: RADIOLOGY

## 2022-03-22 PROCEDURE — 77067 SCR MAMMO BI INCL CAD: CPT | Performed by: RADIOLOGY

## 2022-05-02 DIAGNOSIS — E03.9 HYPOTHYROIDISM, UNSPECIFIED TYPE: ICD-10-CM

## 2022-05-02 RX ORDER — LEVOTHYROXINE SODIUM 0.05 MG/1
50 TABLET ORAL DAILY
Qty: 90 TABLET | Refills: 0 | OUTPATIENT
Start: 2022-05-02

## 2022-05-02 NOTE — TELEPHONE ENCOUNTER
Caller: YudyTimoteo    Relationship: Self    Best call back number: [phone]    Requested Prescriptions:   Requested Prescriptions     Pending Prescriptions Disp Refills   • levothyroxine (SYNTHROID, LEVOTHROID) 50 MCG tablet 90 tablet 0     Sig: Take 1 tablet by mouth Daily.        Pharmacy where request should be sent: [facility] - [address] - [phone]  - [phone] FX     Additional details provided by patient: PATIENT IS COMPLETELY OUT OF THIS MEDICATION. PATIENT ALSO WOULD LIKE A 90 DAYS SUPPLY.  Does the patient have less than a 3 day supply:  [x] Yes  [] No    Galo WRIGHT Rep   05/02/22 14:11 EDT

## 2022-05-06 ENCOUNTER — TELEPHONE (OUTPATIENT)
Dept: INTERNAL MEDICINE | Facility: CLINIC | Age: 47
End: 2022-05-06

## 2022-05-06 DIAGNOSIS — E03.9 HYPOTHYROIDISM, UNSPECIFIED TYPE: ICD-10-CM

## 2022-05-06 RX ORDER — LEVOTHYROXINE SODIUM 0.05 MG/1
50 TABLET ORAL DAILY
Qty: 90 TABLET | Refills: 0 | Status: SHIPPED | OUTPATIENT
Start: 2022-05-06 | End: 2022-08-09

## 2022-05-06 NOTE — TELEPHONE ENCOUNTER
----- Message from Ann-Marie Bertrand sent at 5/6/2022  8:17 AM EDT -----  Pt states she have lot of fatigue and muscle soreness. She ran out of her medicines and had not been taking.

## 2022-06-16 DIAGNOSIS — Z00.00 HEALTHCARE MAINTENANCE: Primary | ICD-10-CM

## 2022-06-16 DIAGNOSIS — E55.9 VITAMIN D DEFICIENCY: ICD-10-CM

## 2022-06-16 DIAGNOSIS — E03.9 HYPOTHYROIDISM, UNSPECIFIED TYPE: ICD-10-CM

## 2022-08-09 DIAGNOSIS — E03.9 HYPOTHYROIDISM, UNSPECIFIED TYPE: ICD-10-CM

## 2022-08-09 RX ORDER — LEVOTHYROXINE SODIUM 0.05 MG/1
50 TABLET ORAL DAILY
Qty: 90 TABLET | Refills: 0 | Status: SHIPPED | OUTPATIENT
Start: 2022-08-09 | End: 2022-08-23 | Stop reason: SDUPTHER

## 2022-08-17 LAB
25(OH)D3+25(OH)D2 SERPL-MCNC: 34.1 NG/ML (ref 30–100)
ALBUMIN SERPL-MCNC: 4.5 G/DL (ref 3.8–4.8)
ALBUMIN/GLOB SERPL: 1.7 {RATIO} (ref 1.2–2.2)
ALP SERPL-CCNC: 71 IU/L (ref 44–121)
ALT SERPL-CCNC: 11 IU/L (ref 0–32)
AST SERPL-CCNC: 16 IU/L (ref 0–40)
BASOPHILS # BLD AUTO: 0 X10E3/UL (ref 0–0.2)
BASOPHILS NFR BLD AUTO: 1 %
BILIRUB SERPL-MCNC: 0.3 MG/DL (ref 0–1.2)
BUN SERPL-MCNC: 17 MG/DL (ref 6–24)
BUN/CREAT SERPL: 24 (ref 9–23)
CALCIUM SERPL-MCNC: 9.6 MG/DL (ref 8.7–10.2)
CHLORIDE SERPL-SCNC: 101 MMOL/L (ref 96–106)
CHOLEST SERPL-MCNC: 208 MG/DL (ref 100–199)
CHOLEST/HDLC SERPL: 4.2 RATIO (ref 0–4.4)
CO2 SERPL-SCNC: 22 MMOL/L (ref 20–29)
CREAT SERPL-MCNC: 0.71 MG/DL (ref 0.57–1)
EGFRCR-CYS SERPLBLD CKD-EPI 2021: 104 ML/MIN/1.73
EOSINOPHIL # BLD AUTO: 0.2 X10E3/UL (ref 0–0.4)
EOSINOPHIL NFR BLD AUTO: 3 %
ERYTHROCYTE [DISTWIDTH] IN BLOOD BY AUTOMATED COUNT: 12 % (ref 11.7–15.4)
GLOBULIN SER CALC-MCNC: 2.7 G/DL (ref 1.5–4.5)
GLUCOSE SERPL-MCNC: 94 MG/DL (ref 65–99)
HCT VFR BLD AUTO: 39.5 % (ref 34–46.6)
HDLC SERPL-MCNC: 49 MG/DL
HGB BLD-MCNC: 13.7 G/DL (ref 11.1–15.9)
IMM GRANULOCYTES # BLD AUTO: 0 X10E3/UL (ref 0–0.1)
IMM GRANULOCYTES NFR BLD AUTO: 0 %
LDLC SERPL CALC-MCNC: 140 MG/DL (ref 0–99)
LYMPHOCYTES # BLD AUTO: 2.2 X10E3/UL (ref 0.7–3.1)
LYMPHOCYTES NFR BLD AUTO: 30 %
MCH RBC QN AUTO: 32.5 PG (ref 26.6–33)
MCHC RBC AUTO-ENTMCNC: 34.7 G/DL (ref 31.5–35.7)
MCV RBC AUTO: 94 FL (ref 79–97)
MONOCYTES # BLD AUTO: 0.5 X10E3/UL (ref 0.1–0.9)
MONOCYTES NFR BLD AUTO: 7 %
NEUTROPHILS # BLD AUTO: 4.4 X10E3/UL (ref 1.4–7)
NEUTROPHILS NFR BLD AUTO: 59 %
PLATELET # BLD AUTO: 343 X10E3/UL (ref 150–450)
POTASSIUM SERPL-SCNC: 4.2 MMOL/L (ref 3.5–5.2)
PROT SERPL-MCNC: 7.2 G/DL (ref 6–8.5)
RBC # BLD AUTO: 4.22 X10E6/UL (ref 3.77–5.28)
SODIUM SERPL-SCNC: 138 MMOL/L (ref 134–144)
T4 FREE SERPL-MCNC: 1.15 NG/DL (ref 0.82–1.77)
TRIGL SERPL-MCNC: 105 MG/DL (ref 0–149)
TSH SERPL DL<=0.005 MIU/L-ACNC: 3 UIU/ML (ref 0.45–4.5)
VLDLC SERPL CALC-MCNC: 19 MG/DL (ref 5–40)
WBC # BLD AUTO: 7.4 X10E3/UL (ref 3.4–10.8)

## 2022-08-23 ENCOUNTER — OFFICE VISIT (OUTPATIENT)
Dept: INTERNAL MEDICINE | Facility: CLINIC | Age: 47
End: 2022-08-23

## 2022-08-23 VITALS
DIASTOLIC BLOOD PRESSURE: 76 MMHG | HEART RATE: 81 BPM | OXYGEN SATURATION: 98 % | WEIGHT: 170 LBS | HEIGHT: 61 IN | SYSTOLIC BLOOD PRESSURE: 132 MMHG | BODY MASS INDEX: 32.1 KG/M2

## 2022-08-23 DIAGNOSIS — Z00.00 HEALTHCARE MAINTENANCE: Primary | ICD-10-CM

## 2022-08-23 DIAGNOSIS — R21 RASH: ICD-10-CM

## 2022-08-23 DIAGNOSIS — E78.2 MODERATE MIXED HYPERLIPIDEMIA NOT REQUIRING STATIN THERAPY: ICD-10-CM

## 2022-08-23 DIAGNOSIS — Z12.11 COLON CANCER SCREENING: ICD-10-CM

## 2022-08-23 DIAGNOSIS — E03.9 HYPOTHYROIDISM, UNSPECIFIED TYPE: ICD-10-CM

## 2022-08-23 PROCEDURE — 99396 PREV VISIT EST AGE 40-64: CPT | Performed by: NURSE PRACTITIONER

## 2022-08-23 RX ORDER — LEVOTHYROXINE SODIUM 0.05 MG/1
50 TABLET ORAL DAILY
Qty: 90 TABLET | Refills: 3 | Status: SHIPPED | OUTPATIENT
Start: 2022-08-23

## 2022-08-23 NOTE — PROGRESS NOTES
"Subjective   Timoteo Jimenes is a 47 y.o. female and is here for a comprehensive physical exam. The patient reports : C/o rash on face. She has been using benadryl and cortisone and that has helped. She now has a rash on her left hip for a few days associated with itching.     Patient is here to follow up on hypothyroidism. Denies any concerns    Do you take any herbs or supplements that were not prescribed by a doctor? no     History:  Patient sees Lorna Vasquez    The following portions of the patient's history were reviewed and updated as appropriate: allergies, current medications, past family history, past medical history, past social history, past surgical history and problem list.    Review of Systems  Do you have pain that bothers you in your daily life? no  Pertinent items are noted in HPI.    Objective   /76 (BP Location: Left arm, Patient Position: Sitting, Cuff Size: Adult)   Pulse 81   Ht 154.9 cm (61\")   Wt 77.1 kg (170 lb)   SpO2 98%   BMI 32.12 kg/m²     General Appearance:    Alert, cooperative, no distress, appears stated age   Head:    Normocephalic, without obvious abnormality, atraumatic   Eyes:    PERRL, conjunctiva/corneas clear, EOM's intact, both eyes   Ears:    Normal TM's and external ear canals, both ears   Nose:   Nares normal, septum midline, mucosa normal, no drainage    or sinus tenderness   Throat:   Lips, mucosa, and tongue normal; teeth and gums normal   Neck:   Supple, symmetrical, trachea midline, no adenopathy;     thyroid:  no enlargement/tenderness/nodules; no carotid    bruit   Back:     Symmetric, no curvature, ROM normal, no CVA tenderness   Lungs:     Clear to auscultation bilaterally, respirations unlabored   Chest Wall:    No tenderness or deformity    Heart:    Regular rate and rhythm, S1 and S2 normal, no murmur       Abdomen:     Soft, non-tender, bowel sounds active all four quadrants,     no masses, no organomegaly           Extremities:   Extremities " normal, atraumatic, no cyanosis or edema   Pulses:   2+ and symmetric all extremities   Skin:   Skin color, texture, turgor normal, urticarial rash on neck; small pink papules on left hip    Lymph nodes:   Cervical, supraclavicular, and axillary nodes normal   Neurologic:   Grossly intact, normal strength, sensation and reflexes     throughout        No visits with results within 2 Week(s) from this visit.   Latest known visit with results is:   Orders Only on 06/16/2022   Component Date Value Ref Range Status   • WBC 08/16/2022 7.4  3.4 - 10.8 x10E3/uL Final   • RBC 08/16/2022 4.22  3.77 - 5.28 x10E6/uL Final   • Hemoglobin 08/16/2022 13.7  11.1 - 15.9 g/dL Final   • Hematocrit 08/16/2022 39.5  34.0 - 46.6 % Final   • MCV 08/16/2022 94  79 - 97 fL Final   • MCH 08/16/2022 32.5  26.6 - 33.0 pg Final   • MCHC 08/16/2022 34.7  31.5 - 35.7 g/dL Final   • RDW 08/16/2022 12.0  11.7 - 15.4 % Final   • Platelets 08/16/2022 343  150 - 450 x10E3/uL Final   • Neutrophil Rel % 08/16/2022 59  Not Estab. % Final   • Lymphocyte Rel % 08/16/2022 30  Not Estab. % Final   • Monocyte Rel % 08/16/2022 7  Not Estab. % Final   • Eosinophil Rel % 08/16/2022 3  Not Estab. % Final   • Basophil Rel % 08/16/2022 1  Not Estab. % Final   • Neutrophils Absolute 08/16/2022 4.4  1.4 - 7.0 x10E3/uL Final   • Lymphocytes Absolute 08/16/2022 2.2  0.7 - 3.1 x10E3/uL Final   • Monocytes Absolute 08/16/2022 0.5  0.1 - 0.9 x10E3/uL Final   • Eosinophils Absolute 08/16/2022 0.2  0.0 - 0.4 x10E3/uL Final   • Basophils Absolute 08/16/2022 0.0  0.0 - 0.2 x10E3/uL Final   • Immature Granulocyte Rel % 08/16/2022 0  Not Estab. % Final   • Immature Grans Absolute 08/16/2022 0.0  0.0 - 0.1 x10E3/uL Final   • Glucose 08/16/2022 94  65 - 99 mg/dL Final   • BUN 08/16/2022 17  6 - 24 mg/dL Final   • Creatinine 08/16/2022 0.71  0.57 - 1.00 mg/dL Final   • EGFR Result 08/16/2022 104  >59 mL/min/1.73 Final   • BUN/Creatinine Ratio 08/16/2022 24 (A) 9 - 23 Final   •  Sodium 08/16/2022 138  134 - 144 mmol/L Final   • Potassium 08/16/2022 4.2  3.5 - 5.2 mmol/L Final   • Chloride 08/16/2022 101  96 - 106 mmol/L Final   • Total CO2 08/16/2022 22  20 - 29 mmol/L Final   • Calcium 08/16/2022 9.6  8.7 - 10.2 mg/dL Final   • Total Protein 08/16/2022 7.2  6.0 - 8.5 g/dL Final   • Albumin 08/16/2022 4.5  3.8 - 4.8 g/dL Final   • Globulin 08/16/2022 2.7  1.5 - 4.5 g/dL Final   • A/G Ratio 08/16/2022 1.7  1.2 - 2.2 Final   • Total Bilirubin 08/16/2022 0.3  0.0 - 1.2 mg/dL Final   • Alkaline Phosphatase 08/16/2022 71  44 - 121 IU/L Final   • AST (SGOT) 08/16/2022 16  0 - 40 IU/L Final   • ALT (SGPT) 08/16/2022 11  0 - 32 IU/L Final   • Total Cholesterol 08/16/2022 208 (A) 100 - 199 mg/dL Final   • Triglycerides 08/16/2022 105  0 - 149 mg/dL Final   • HDL Cholesterol 08/16/2022 49  >39 mg/dL Final   • VLDL Cholesterol [place] 08/16/2022 19  5 - 40 mg/dL Final   • LDL Chol Calc ([facility]) 08/16/2022 140 (A) 0 - 99 mg/dL Final   • Chol/HDL Ratio 08/16/2022 4.2  0.0 - 4.4 ratio Final    Comment:                                   T. Chol/HDL Ratio                                              Men  Women                                1/2 Avg.Risk  3.4    3.3                                    Avg.Risk  5.0    4.4                                 2X Avg.Risk  9.6    7.1                                 3X Avg.Risk 23.4   11.0     • TSH 08/16/2022 3.000  0.450 - 4.500 uIU/mL Final   • Free T4 08/16/2022 1.15  0.82 - 1.77 ng/dL Final   • 25 Hydroxy, Vitamin D 08/16/2022 34.1  30.0 - 100.0 ng/mL Final    Comment: Vitamin D deficiency has been defined by the [place] of  [employer] and an Endocrine Society practice guideline as a  level of serum 25-OH vitamin D less than 20 ng/mL (1,2).  The Endocrine Society went on to further define vitamin D  insufficiency as a level between 21 and 29 ng/mL (2).  1. IOM ([place] [employer]). 2010. Dietary reference     intakes for calcium and D. [place]: The      National [employer]l.it Press.  2. Janet MF, Tiffanie HUSSEIN, Sebastien HATHAWAY, et al.     Evaluation, treatment, and prevention of vitamin D     deficiency: an Endocrine Society clinical practice     guideline. JCEM. 2011 Jul; 96(7):1911-30.       Reviewed labs with patient.     Assessment & Plan   Healthy female exam.      1. Diagnoses and all orders for this visit:    1. Healthcare maintenance (Primary)    2. Colon cancer screening  -     Ambulatory Referral to Gastroenterology    3. Hypothyroidism, unspecified type  -     levothyroxine (SYNTHROID, LEVOTHROID) 50 MCG tablet; Take 1 tablet by mouth Daily.  Dispense: 90 tablet; Refill: 3    4. Rash    5. Moderate mixed hyperlipidemia not requiring statin therapy    rash - okay to continue with cortisone cream and benadryl    Hypothyroidism - continue levothyroxine 50 mcg daily    2. Patient Counseling:  --Nutrition: Stressed importance of moderation in sodium/caffeine intake, saturated fat and cholesterol, caloric balance, sufficient intake of fresh fruits, vegetables, fiber, calcium, iron.   --Exercise: Stressed the importance of regular exercise.   --Dental health: Discussed importance of regular tooth brushing, flossing, and dental visits.  --Immunizations reviewed.    3. Follow up next physical in 1 year

## 2023-09-23 DIAGNOSIS — E03.9 HYPOTHYROIDISM, UNSPECIFIED TYPE: ICD-10-CM

## 2023-09-25 RX ORDER — LEVOTHYROXINE SODIUM 50 MCG
TABLET ORAL
Qty: 90 TABLET | Refills: 3 | OUTPATIENT
Start: 2023-09-25

## 2024-08-13 DIAGNOSIS — E53.8 VITAMIN B12 DEFICIENCY: ICD-10-CM

## 2024-08-13 DIAGNOSIS — Z00.00 HEALTHCARE MAINTENANCE: ICD-10-CM

## 2024-08-13 DIAGNOSIS — E55.9 VITAMIN D DEFICIENCY: Primary | ICD-10-CM

## 2024-08-13 DIAGNOSIS — D50.8 OTHER IRON DEFICIENCY ANEMIA: ICD-10-CM

## 2024-08-16 LAB
25(OH)D3+25(OH)D2 SERPL-MCNC: 31 NG/ML (ref 30–100)
ALBUMIN SERPL-MCNC: 4.8 G/DL (ref 3.5–5.2)
ALBUMIN/GLOB SERPL: 1.8 G/DL
ALP SERPL-CCNC: 74 U/L (ref 39–117)
ALT SERPL-CCNC: 16 U/L (ref 1–33)
AST SERPL-CCNC: 20 U/L (ref 1–32)
BASOPHILS # BLD AUTO: 0.05 10*3/MM3 (ref 0–0.2)
BASOPHILS NFR BLD AUTO: 0.7 % (ref 0–1.5)
BILIRUB SERPL-MCNC: 0.3 MG/DL (ref 0–1.2)
BUN SERPL-MCNC: 12 MG/DL (ref 6–20)
BUN/CREAT SERPL: 17.1 (ref 7–25)
CALCIUM SERPL-MCNC: 9.6 MG/DL (ref 8.6–10.5)
CHLORIDE SERPL-SCNC: 101 MMOL/L (ref 98–107)
CHOLEST SERPL-MCNC: 210 MG/DL (ref 0–200)
CO2 SERPL-SCNC: 26.8 MMOL/L (ref 22–29)
CREAT SERPL-MCNC: 0.7 MG/DL (ref 0.57–1)
EGFRCR SERPLBLD CKD-EPI 2021: 104.9 ML/MIN/1.73
EOSINOPHIL # BLD AUTO: 0.26 10*3/MM3 (ref 0–0.4)
EOSINOPHIL NFR BLD AUTO: 3.6 % (ref 0.3–6.2)
ERYTHROCYTE [DISTWIDTH] IN BLOOD BY AUTOMATED COUNT: 11.7 % (ref 12.3–15.4)
FERRITIN SERPL-MCNC: 126 NG/ML (ref 13–150)
GLOBULIN SER CALC-MCNC: 2.6 GM/DL
GLUCOSE SERPL-MCNC: 90 MG/DL (ref 65–99)
HBA1C MFR BLD: 5.3 % (ref 4.8–5.6)
HCT VFR BLD AUTO: 43.9 % (ref 34–46.6)
HDLC SERPL-MCNC: 49 MG/DL (ref 40–60)
HGB BLD-MCNC: 14.8 G/DL (ref 12–15.9)
IMM GRANULOCYTES # BLD AUTO: 0.02 10*3/MM3 (ref 0–0.05)
IMM GRANULOCYTES NFR BLD AUTO: 0.3 % (ref 0–0.5)
LDLC SERPL CALC-MCNC: 139 MG/DL (ref 0–100)
LDLC/HDLC SERPL: 2.79 {RATIO}
LYMPHOCYTES # BLD AUTO: 2.08 10*3/MM3 (ref 0.7–3.1)
LYMPHOCYTES NFR BLD AUTO: 28.6 % (ref 19.6–45.3)
MCH RBC QN AUTO: 31.7 PG (ref 26.6–33)
MCHC RBC AUTO-ENTMCNC: 33.7 G/DL (ref 31.5–35.7)
MCV RBC AUTO: 94 FL (ref 79–97)
MONOCYTES # BLD AUTO: 0.61 10*3/MM3 (ref 0.1–0.9)
MONOCYTES NFR BLD AUTO: 8.4 % (ref 5–12)
NEUTROPHILS # BLD AUTO: 4.25 10*3/MM3 (ref 1.7–7)
NEUTROPHILS NFR BLD AUTO: 58.4 % (ref 42.7–76)
NRBC BLD AUTO-RTO: 0 /100 WBC (ref 0–0.2)
PLATELET # BLD AUTO: 389 10*3/MM3 (ref 140–450)
POTASSIUM SERPL-SCNC: 4.9 MMOL/L (ref 3.5–5.2)
PROT SERPL-MCNC: 7.4 G/DL (ref 6–8.5)
RBC # BLD AUTO: 4.67 10*6/MM3 (ref 3.77–5.28)
SODIUM SERPL-SCNC: 137 MMOL/L (ref 136–145)
T4 FREE SERPL-MCNC: 1.07 NG/DL (ref 0.92–1.68)
TRIGL SERPL-MCNC: 122 MG/DL (ref 0–150)
TSH SERPL DL<=0.005 MIU/L-ACNC: 1.94 UIU/ML (ref 0.27–4.2)
VIT B12 SERPL-MCNC: 1243 PG/ML (ref 211–946)
VLDLC SERPL CALC-MCNC: 22 MG/DL (ref 5–40)
WBC # BLD AUTO: 7.27 10*3/MM3 (ref 3.4–10.8)

## 2024-08-19 ENCOUNTER — OFFICE VISIT (OUTPATIENT)
Dept: INTERNAL MEDICINE | Facility: CLINIC | Age: 49
End: 2024-08-19
Payer: COMMERCIAL

## 2024-08-19 VITALS
HEART RATE: 82 BPM | OXYGEN SATURATION: 99 % | DIASTOLIC BLOOD PRESSURE: 80 MMHG | WEIGHT: 172 LBS | HEIGHT: 61 IN | SYSTOLIC BLOOD PRESSURE: 126 MMHG | BODY MASS INDEX: 32.47 KG/M2

## 2024-08-19 DIAGNOSIS — K52.9 COLITIS: ICD-10-CM

## 2024-08-19 DIAGNOSIS — Z00.00 HEALTHCARE MAINTENANCE: Primary | ICD-10-CM

## 2024-08-19 DIAGNOSIS — Z87.19 HISTORY OF COLITIS: ICD-10-CM

## 2024-08-19 DIAGNOSIS — Z12.11 COLON CANCER SCREENING: ICD-10-CM

## 2024-08-19 PROCEDURE — 99213 OFFICE O/P EST LOW 20 MIN: CPT | Performed by: NURSE PRACTITIONER

## 2024-08-19 PROCEDURE — 99396 PREV VISIT EST AGE 40-64: CPT | Performed by: NURSE PRACTITIONER

## 2024-08-19 RX ORDER — PROGESTERONE 200 MG/1
200 CAPSULE ORAL DAILY
COMMUNITY

## 2024-08-19 RX ORDER — EZETIMIBE 10 MG/1
10 TABLET ORAL DAILY
COMMUNITY

## 2024-08-19 RX ORDER — LEVOTHYROXINE, LIOTHYRONINE 19; 4.5 UG/1; UG/1
30 TABLET ORAL DAILY
COMMUNITY
Start: 2024-07-09

## 2024-08-19 NOTE — PROGRESS NOTES
"Subjective   Timoteo Jimenes is a 49 y.o. female and is here for a comprehensive physical exam. The patient reports  : C/o upset stomach.  She does have a history of colitis.  States that she has diarrhea frequently.    Do you take any herbs or supplements that were not prescribed by a doctor?  Magnesium, melatonin     History:  LMP: No LMP recorded.  Mammogram   Lorna Vasquez at [facility]  See also sees Dr. Ochoa at [facility]    The following portions of the patient's history were reviewed and updated as appropriate: allergies, current medications, past family history, past medical history, past social history, past surgical history and problem list.    Review of Systems  Do you have pain that bothers you in your daily life? no  Pertinent items are noted in HPI.    Objective   /80   Pulse 82   Ht 154.9 cm (60.98\")   Wt 78 kg (172 lb)   SpO2 99%   BMI 32.52 kg/m²     General Appearance:    Alert, cooperative, no distress, appears stated age   Head:    Normocephalic, without obvious abnormality, atraumatic   Eyes:    PERRL, conjunctiva/corneas clear, EOM's intact, both eyes   Ears:    Normal TM's and external ear canals, both ears   Nose:   Nares normal, septum midline, mucosa normal, no drainage    or sinus tenderness   Throat:   Lips, mucosa, and tongue normal; teeth and gums normal   Neck:   Supple, symmetrical, trachea midline, no adenopathy;     thyroid:  no enlargement/tenderness/nodules; no carotid    bruit   Back:     Symmetric, no curvature, ROM normal, no CVA tenderness   Lungs:     Clear to auscultation bilaterally, respirations unlabored   Chest Wall:    No tenderness or deformity    Heart:    Regular rate and rhythm, S1 and S2 normal, no murmur       Abdomen:     Soft, non-tender, bowel sounds active all four quadrants,     no masses, no organomegaly           Extremities:   Extremities normal, atraumatic, no cyanosis or edema   Pulses:   2+ and symmetric all " extremities   Skin:   Skin color, texture, turgor normal, no rashes or lesions   Lymph nodes:   Cervical, supraclavicular, and axillary nodes normal   Neurologic:   Grossly intact, normal strength, sensation and reflexes     throughout        Orders Only on 08/13/2024   Component Date Value Ref Range Status    Total Cholesterol 08/15/2024 210 (H)  0 - 200 mg/dL Final    Comment: Cholesterol Reference Ranges  (U.S. Department of Health and Human Services ATP III  Classifications)  Desirable          <200 mg/dL  Borderline High    200-239 mg/dL  High Risk          >240 mg/dL  Triglyceride Reference Ranges  (U.S. Department of Health and Human Services ATP III  Classifications)  Normal           <150 mg/dL  Borderline High  150-199 mg/dL  High             200-499 mg/dL  Very High        >500 mg/dL  HDL Reference Ranges  (U.S. Department of Health and Human Services ATP III  Classifications)  Low     <40 mg/dl (major risk factor for CHD)  High    >60 mg/dl ('negative' risk factor for CHD)  LDL Reference Ranges  (U.S. Department of Health and Human Services ATP III  Classifications)  Optimal          <100 mg/dL  Near Optimal     100-129 mg/dL  Borderline High  130-159 mg/dL  High             160-189 mg/dL  Very High        >189 mg/dL      Triglycerides 08/15/2024 122  0 - 150 mg/dL Final    HDL Cholesterol 08/15/2024 49  40 - 60 mg/dL Final    VLDL Cholesterol [place] 08/15/2024 22  5 - 40 mg/dL Final    LDL Chol Calc (NIH) 08/15/2024 139 (H)  0 - 100 mg/dL Final    LDL/HDL RATIO 08/15/2024 2.79   Final    Glucose 08/15/2024 90  65 - 99 mg/dL Final    BUN 08/15/2024 12  6 - 20 mg/dL Final    Creatinine 08/15/2024 0.70  0.57 - 1.00 mg/dL Final    EGFR Result 08/15/2024 104.9  >60.0 mL/min/1.73 Final    Comment: GFR Normal >60  Chronic Kidney Disease <60  Kidney Failure <15      BUN/Creatinine Ratio 08/15/2024 17.1  7.0 - 25.0 Final    Sodium 08/15/2024 137  136 - 145 mmol/L Final    Potassium 08/15/2024 4.9  3.5 - 5.2 mmol/L  Final    Chloride 08/15/2024 101  98 - 107 mmol/L Final    Total CO2 08/15/2024 26.8  22.0 - 29.0 mmol/L Final    Calcium 08/15/2024 9.6  8.6 - 10.5 mg/dL Final    Total Protein 08/15/2024 7.4  6.0 - 8.5 g/dL Final    Albumin 08/15/2024 4.8  3.5 - 5.2 g/dL Final    Globulin 08/15/2024 2.6  gm/dL Final    A/G Ratio 08/15/2024 1.8  g/dL Final    Total Bilirubin 08/15/2024 0.3  0.0 - 1.2 mg/dL Final    Alkaline Phosphatase 08/15/2024 74  39 - 117 U/L Final    AST (SGOT) 08/15/2024 20  1 - 32 U/L Final    ALT (SGPT) 08/15/2024 16  1 - 33 U/L Final    WBC 08/15/2024 7.27  3.40 - 10.80 10*3/mm3 Final    RBC 08/15/2024 4.67  3.77 - 5.28 10*6/mm3 Final    Hemoglobin 08/15/2024 14.8  12.0 - 15.9 g/dL Final    Hematocrit 08/15/2024 43.9  34.0 - 46.6 % Final    MCV 08/15/2024 94.0  79.0 - 97.0 fL Final    MCH 08/15/2024 31.7  26.6 - 33.0 pg Final    MCHC 08/15/2024 33.7  31.5 - 35.7 g/dL Final    RDW 08/15/2024 11.7 (L)  12.3 - 15.4 % Final    Platelets 08/15/2024 389  140 - 450 10*3/mm3 Final    Neutrophil Rel % 08/15/2024 58.4  42.7 - 76.0 % Final    Lymphocyte Rel % 08/15/2024 28.6  19.6 - 45.3 % Final    Monocyte Rel % 08/15/2024 8.4  5.0 - 12.0 % Final    Eosinophil Rel % 08/15/2024 3.6  0.3 - 6.2 % Final    Basophil Rel % 08/15/2024 0.7  0.0 - 1.5 % Final    Neutrophils Absolute 08/15/2024 4.25  1.70 - 7.00 10*3/mm3 Final    Lymphocytes Absolute 08/15/2024 2.08  0.70 - 3.10 10*3/mm3 Final    Monocytes Absolute 08/15/2024 0.61  0.10 - 0.90 10*3/mm3 Final    Eosinophils Absolute 08/15/2024 0.26  0.00 - 0.40 10*3/mm3 Final    Basophils Absolute 08/15/2024 0.05  0.00 - 0.20 10*3/mm3 Final    Immature Granulocyte Rel % 08/15/2024 0.3  0.0 - 0.5 % Final    Immature Grans Absolute 08/15/2024 0.02  0.00 - 0.05 10*3/mm3 Final    nRBC 08/15/2024 0.0  0.0 - 0.2 /100 WBC Final    Hemoglobin A1C 08/15/2024 5.30  4.80 - 5.60 % Final    Comment: Hemoglobin A1C Ranges:  Increased Risk for Diabetes  5.7% to 6.4%  Diabetes                      >= 6.5%  Diabetic Goal                < 7.0%      Free T4 08/15/2024 1.07  0.92 - 1.68 ng/dL Final    TSH 08/15/2024 1.940  0.270 - 4.200 uIU/mL Final    Vitamin B-12 08/15/2024 1,243 (H)  211 - 946 pg/mL Final    Results may be falsely increased if patient taking Biotin.    25 Hydroxy, Vitamin D 08/15/2024 31.0  30.0 - 100.0 ng/ml Final    Comment: Reference Range for Total Vitamin D 25(OH)  Deficiency <20.0 ng/mL  Insufficiency 21-29 ng/mL  Sufficiency  ng/mL  Toxicity >100 ng/ml      Ferritin 08/15/2024 126.00  13.00 - 150.00 ng/mL Final    Results may be falsely decreased if patient taking Biotin.     Reviewed labs with patient.     The 10-year ASCVD risk score (Bull RODGERS, et al., 2019) is: 1.6%    Values used to calculate the score:      Age: 49 years      Sex: Female      Is Non- : No      Diabetic: No      Tobacco smoker: No      Systolic Blood Pressure: 126 mmHg      Is BP treated: No      HDL Cholesterol: 49 mg/dL      Total Cholesterol: 210 mg/dL      Assessment & Plan   Healthy female exam.      1. Diagnoses and all orders for this visit:    1. Healthcare maintenance (Primary)    2. Colon cancer screening  -     Cancel: Cologuard - Stool, Per Rectum; Future  -     Ambulatory Referral to Gastroenterology    3. Colitis    4. History of colitis  -     Ambulatory Referral to Gastroenterology      Due to patient's history of colitis and the fact that she is due for colon cancer screening, will refer to GI  2. Patient Counseling:  --Nutrition: Stressed importance of moderation in sodium/caffeine intake, saturated fat and cholesterol, caloric balance, sufficient intake of fresh fruits, vegetables, fiber, calcium, iron. Food elimination diet  --Exercise: Stressed the importance of regular exercise.   --Dental health: Discussed importance of regular tooth brushing, flossing, and dental visits.   --Immunizations reviewed.    3. Discussed the patient's BMI with her.  The BMI is  above average; BMI management plan is completed  BMI is >= 30 and <35. (Class 1 Obesity). The following options were offered after discussion;: exercise counseling/recommendations and nutrition counseling/recommendations     4. Follow up next physical in 1 year

## 2024-11-19 ENCOUNTER — TELEPHONE (OUTPATIENT)
Dept: GASTROENTEROLOGY | Facility: CLINIC | Age: 49
End: 2024-11-19
Payer: COMMERCIAL

## 2024-11-19 NOTE — TELEPHONE ENCOUNTER
LAST COLONOSCOPY-?/?  DR MARK BRUNNER  FAMILY HISTORY: SISTER/POLYP/20  DID ZARA DIARRHEA  SCHEDULE AT [facility]

## 2024-11-19 NOTE — TELEPHONE ENCOUNTER
Patient marked history of colitis.  Please schedule patient for an office visit to further assess diarrhea and obtain additional information regarding colitis history.    Mara Monet, APRN

## 2025-01-10 ENCOUNTER — TELEPHONE (OUTPATIENT)
Dept: GASTROENTEROLOGY | Facility: CLINIC | Age: 50
End: 2025-01-10

## 2025-01-10 ENCOUNTER — PREP FOR SURGERY (OUTPATIENT)
Dept: SURGERY | Facility: SURGERY CENTER | Age: 50
End: 2025-01-10
Payer: COMMERCIAL

## 2025-01-10 ENCOUNTER — OFFICE VISIT (OUTPATIENT)
Dept: GASTROENTEROLOGY | Facility: CLINIC | Age: 50
End: 2025-01-10
Payer: COMMERCIAL

## 2025-01-10 VITALS
SYSTOLIC BLOOD PRESSURE: 120 MMHG | BODY MASS INDEX: 32.51 KG/M2 | WEIGHT: 172.2 LBS | DIASTOLIC BLOOD PRESSURE: 70 MMHG | HEIGHT: 61 IN

## 2025-01-10 DIAGNOSIS — K51.20 ULCERATIVE PROCTITIS WITHOUT COMPLICATION: ICD-10-CM

## 2025-01-10 DIAGNOSIS — R14.0 ABDOMINAL BLOATING: ICD-10-CM

## 2025-01-10 DIAGNOSIS — Z12.11 ENCOUNTER FOR SCREENING COLONOSCOPY: ICD-10-CM

## 2025-01-10 DIAGNOSIS — K58.0 IRRITABLE BOWEL SYNDROME WITH DIARRHEA: ICD-10-CM

## 2025-01-10 DIAGNOSIS — K58.0 IRRITABLE BOWEL SYNDROME WITH DIARRHEA: Primary | ICD-10-CM

## 2025-01-10 DIAGNOSIS — R10.84 GENERALIZED ABDOMINAL PAIN: ICD-10-CM

## 2025-01-10 DIAGNOSIS — K21.9 GERD (GASTROESOPHAGEAL REFLUX DISEASE): ICD-10-CM

## 2025-01-10 DIAGNOSIS — R10.84 GENERALIZED ABDOMINAL PAIN: Primary | ICD-10-CM

## 2025-01-10 RX ORDER — SODIUM CHLORIDE, SODIUM LACTATE, POTASSIUM CHLORIDE, CALCIUM CHLORIDE 600; 310; 30; 20 MG/100ML; MG/100ML; MG/100ML; MG/100ML
30 INJECTION, SOLUTION INTRAVENOUS CONTINUOUS PRN
OUTPATIENT
Start: 2025-01-10 | End: 2025-01-11

## 2025-01-10 RX ORDER — SODIUM CHLORIDE 0.9 % (FLUSH) 0.9 %
3 SYRINGE (ML) INJECTION EVERY 12 HOURS SCHEDULED
OUTPATIENT
Start: 2025-01-10

## 2025-01-10 RX ORDER — SODIUM CHLORIDE 0.9 % (FLUSH) 0.9 %
10 SYRINGE (ML) INJECTION AS NEEDED
OUTPATIENT
Start: 2025-01-10

## 2025-01-10 NOTE — TELEPHONE ENCOUNTER
[facility]  Your PA has been resolved, no additional PA is required. For further inquiries please contact the number on the back of the member prescription card. (Message [phone])

## 2025-01-10 NOTE — PROGRESS NOTES
Chief Complaint   Patient presents with    Diarrhea    Abdominal Pain    Heartburn    Food Intolerance         Patient is a 49 y.o. who presents to the office the patient for further evaluation of colitis prior to undergoing colonoscopy for screening purposes.   Patient has a significant past medical history of colon polyps       2009 Colonoscopy: unsure of results.  Completed with Dr. Amarjit Christensen     Past Surgical History:  Hysterectomy    Social History: Denies use of tobacco or illicit drugs  Describes social alcohol consumption    Family history:  Colon polyps in sister  Known family history of colon cancer or IBD    History of Present Illness  The patient presents for evaluation of ulcerative colitis.    The patient was diagnosed with ulcerative colitis approximately 15 years ago and has undergone 3-4 colonoscopies under the care of Dr. ChristensenThe exact date of the last colonoscopy is uncertain but is likely around the time of diagnosis.     The patient reports significant gastrointestinal disturbances over the past few years, which coincide with the initiation of hormone therapy. Since August 2024, the patient has been on a food elimination diet as recommended by a dietitian. Despite adherence to this diet, symptoms have persisted, and the dietitian has suggested a stool study to evaluate for gut dysbiosis. The patient reports frequent bowel movements, with a severe episode occurring yesterday after consuming coffee, salad with chicken, white beans, avocado, carrots, homemade dressing, and gluten-free crackers.     This episode was characterized by severe abdominal pain at 3:30 PM, which subsided after passing flatus. The patient feels exhausted and has ceased exercising. Prior to the elimination diet, the patient experienced frequent loose stools; currently, stools are more solid, typically occurring twice daily, depending on dietary intake.     The patient experiences pain multiple times a week during bowel  "movements. There is no current hematochezia, fever, chills, or dysphagia.     The patient experiences acid reflux and takes Nexium as needed. Occasional nausea is reported, but there is no vomiting. There is no unintentional weight loss or nocturnal bowel movements.  She has been taking Nexium for the past 2 years    The patient has a family history of ulcerative colitis in a half-sister.     Previous treatments for Ulcerative proctitis: Canasa suppositories at night.  Denies use within the past 15 years there is a past history of polyps and hematochezia, though none within past several years         Result Review :        reviewed and updated with patient  Outpatient Medications Marked as Taking for the 1/10/25 encounter (Office Visit) with Mara Monet APRN   Medication Sig Dispense Refill    esomeprazole (nexIUM) 20 MG capsule Take 1 capsule by mouth Daily.      estrogens, conjugated, (PREMARIN) 0.3 MG tablet Take 1 tablet by mouth Daily. Take daily for 21 days then do not take for 7 days.      ezetimibe (ZETIA) 10 MG tablet Take 1 tablet by mouth Daily.      MULTIPLE VITAMIN PO Take  by mouth.      NP Thyroid 30 MG tablet Take 1 tablet by mouth Daily.      Progesterone (PROMETRIUM) 200 MG capsule Take 1 capsule by mouth Daily.      Unable to find Take 1 each by mouth Daily. TRUE ADAPT, ESTRODIM, ESTROGEN,       Vital Signs:   /70 (BP Location: Left arm, Patient Position: Sitting, Cuff Size: Adult)   Ht 154.9 cm (60.98\")   Wt 78.1 kg (172 lb 3.2 oz)   BMI 32.55 kg/m²     Body mass index is 32.55 kg/m².  Physical Exam  Constitutional:       General: She is not in acute distress.     Appearance: Normal appearance. She is not ill-appearing.   HENT:      Head: Normocephalic.   Eyes:      General: No scleral icterus.  Pulmonary:      Effort: No respiratory distress.   Abdominal:      General: Abdomen is flat. Bowel sounds are normal. There is no distension.      Palpations: Abdomen is soft. There is no " mass.      Tenderness: There is no abdominal tenderness. There is no guarding or rebound.      Hernia: No hernia is present.   Skin:     General: Skin is warm and dry.   Neurological:      Mental Status: She is alert.      Gait: Gait normal.   Psychiatric:         Mood and Affect: Mood normal.       Assessment and Plan    Diagnoses and all orders for this visit:    1. Irritable bowel syndrome with diarrhea (Primary)  -     riFAXIMin (Xifaxan) 550 MG tablet; Take 1 tablet by mouth 3 (Three) Times a Day for 14 days.  Dispense: 42 tablet; Refill: 2    2. Generalized abdominal pain    3. Abdominal bloating    4. Ulcerative proctitis without complication    5. Encounter for screening colonoscopy       Assessment & Plan  Abdominal pain, unpredictable bowel habits  - Ulcerative proctitis dx approx 15 years ago at outside GI office  -Discussed symptoms can overlap with irritable bowel syndrome with diarrhea considering frequency, abdominal pain, and stool consistency changes.  - Prescribed Xifaxan 550 mg TID for 14 days to normalize gut microbiome, reduce bloating, gas, and pain    For GERD and abdominal bloating:   - Ordered upper endoscopy and colonoscopy to investigate symptoms as well as for colon cancer screening      Follow-up  - Follow up in 4-6 weeks post-procedure to assess progress and determine next steps    Patient is agreeable to the outlined above treatment plan.  Verbalizes understanding and will contact office for any new or worsening concerns.  All questions answered and support provided.  Patient Instructions   Schedule EGD and colonoscopy, orders placed     For Diarrhea, Abdominal Bloating, and stool frequency:     1.We will prescribe you a course of a gut specific antibiotic called Xifaxan. You will take 1 tab 3 x daily x 14 days. Please provide our office a 2 week update via [facility] or telephone call so that additional recommendations can be provided based on your symptom response to Xifaxan.     Ways  to help reduce heartburn symptoms:      Avoid eating late night snacks within 3 hours of going to bed  If you have increased abdominal body weight, lifestyle changes to improve weight can help with heartburn symtoms  If you use tobacco products, we recommend that you stop smoking including e-cigarettes  Research has proven that people with heartburn who use tobacco can reduce heartburn symptoms by 44% after they stop smoking.   Sleep on your left side  Sleeping with your head/upper body elevated with a wedge pillow or with the head of the bed inclined   Avoid foods that can trigger symptoms which may include:  citrus fruits  spicy foods,  Tomatoes and Red sauces   Chocolate  coffee/tea  caffeinated or carbonated beverages  Alcohol  High fat foods  peppermint      EMR Dragon/Transcription Disclaimer:  This document has been Dictated utilizing Dragon dictation.   Patient or patient representative verbalized consent for the use of Ambient Listening during the visit with  KOFI Camargo for chart documentation. 1/10/2025  10:51 EST

## 2025-01-10 NOTE — PATIENT INSTRUCTIONS
Schedule EGD and colonoscopy, orders placed     For Diarrhea, Abdominal Bloating, and stool frequency:     1.We will prescribe you a course of a gut specific antibiotic called Xifaxan. You will take 1 tab 3 x daily x 14 days. Please provide our office a 2 week update via [employer] or telephone call so that additional recommendations can be provided based on your symptom response to Xifaxan.     Ways to help reduce heartburn symptoms:      Avoid eating late night snacks within 3 hours of going to bed  If you have increased abdominal body weight, lifestyle changes to improve weight can help with heartburn symtoms  If you use tobacco products, we recommend that you stop smoking including e-cigarettes  Research has proven that people with heartburn who use tobacco can reduce heartburn symptoms by 44% after they stop smoking.   Sleep on your left side  Sleeping with your head/upper body elevated with a wedge pillow or with the head of the bed inclined   Avoid foods that can trigger symptoms which may include:  citrus fruits  spicy foods,  Tomatoes and Red sauces   Chocolate  coffee/tea  caffeinated or carbonated beverages  Alcohol  High fat foods  peppermint

## 2025-02-21 ENCOUNTER — ANESTHESIA EVENT (OUTPATIENT)
Dept: PERIOP | Facility: HOSPITAL | Age: 50
End: 2025-02-21
Payer: COMMERCIAL

## 2025-02-24 ENCOUNTER — ANESTHESIA (OUTPATIENT)
Dept: PERIOP | Facility: HOSPITAL | Age: 50
End: 2025-02-24
Payer: COMMERCIAL

## 2025-02-24 ENCOUNTER — HOSPITAL ENCOUNTER (OUTPATIENT)
Facility: HOSPITAL | Age: 50
Setting detail: HOSPITAL OUTPATIENT SURGERY
Discharge: HOME | End: 2025-02-24
Attending: STUDENT IN AN ORGANIZED HEALTH CARE EDUCATION/TRAINING PROGRAM | Admitting: STUDENT IN AN ORGANIZED HEALTH CARE EDUCATION/TRAINING PROGRAM
Payer: COMMERCIAL

## 2025-02-24 VITALS
TEMPERATURE: 97.8 F | RESPIRATION RATE: 11 BRPM | HEIGHT: 61 IN | OXYGEN SATURATION: 98 % | DIASTOLIC BLOOD PRESSURE: 86 MMHG | HEART RATE: 67 BPM | SYSTOLIC BLOOD PRESSURE: 127 MMHG | BODY MASS INDEX: 32.47 KG/M2 | WEIGHT: 172 LBS

## 2025-02-24 DIAGNOSIS — R14.0 ABDOMINAL BLOATING: ICD-10-CM

## 2025-02-24 DIAGNOSIS — K21.9 GERD (GASTROESOPHAGEAL REFLUX DISEASE): ICD-10-CM

## 2025-02-24 DIAGNOSIS — R10.84 GENERALIZED ABDOMINAL PAIN: ICD-10-CM

## 2025-02-24 DIAGNOSIS — K58.0 IRRITABLE BOWEL SYNDROME WITH DIARRHEA: ICD-10-CM

## 2025-02-24 DIAGNOSIS — K51.20 ULCERATIVE PROCTITIS WITHOUT COMPLICATION: ICD-10-CM

## 2025-02-24 DIAGNOSIS — Z12.11 ENCOUNTER FOR SCREENING COLONOSCOPY: ICD-10-CM

## 2025-02-24 PROCEDURE — 43239 EGD BIOPSY SINGLE/MULTIPLE: CPT | Performed by: STUDENT IN AN ORGANIZED HEALTH CARE EDUCATION/TRAINING PROGRAM

## 2025-02-24 PROCEDURE — 25010000002 PROPOFOL 200 MG/20ML EMULSION

## 2025-02-24 PROCEDURE — 25010000002 LIDOCAINE 2% SOLUTION

## 2025-02-24 PROCEDURE — 88305 TISSUE EXAM BY PATHOLOGIST: CPT | Performed by: STUDENT IN AN ORGANIZED HEALTH CARE EDUCATION/TRAINING PROGRAM

## 2025-02-24 PROCEDURE — 45380 COLONOSCOPY AND BIOPSY: CPT | Performed by: STUDENT IN AN ORGANIZED HEALTH CARE EDUCATION/TRAINING PROGRAM

## 2025-02-24 RX ORDER — SODIUM CHLORIDE, SODIUM LACTATE, POTASSIUM CHLORIDE, CALCIUM CHLORIDE 600; 310; 30; 20 MG/100ML; MG/100ML; MG/100ML; MG/100ML
30 INJECTION, SOLUTION INTRAVENOUS CONTINUOUS PRN
Status: DISCONTINUED | OUTPATIENT
Start: 2025-02-24 | End: 2025-02-24 | Stop reason: HOSPADM

## 2025-02-24 RX ORDER — SODIUM CHLORIDE 9 MG/ML
40 INJECTION, SOLUTION INTRAVENOUS AS NEEDED
Status: DISCONTINUED | OUTPATIENT
Start: 2025-02-24 | End: 2025-02-24 | Stop reason: HOSPADM

## 2025-02-24 RX ORDER — SODIUM CHLORIDE, SODIUM LACTATE, POTASSIUM CHLORIDE, CALCIUM CHLORIDE 600; 310; 30; 20 MG/100ML; MG/100ML; MG/100ML; MG/100ML
9 INJECTION, SOLUTION INTRAVENOUS CONTINUOUS
Status: DISCONTINUED | OUTPATIENT
Start: 2025-02-24 | End: 2025-02-24 | Stop reason: HOSPADM

## 2025-02-24 RX ORDER — LIDOCAINE HYDROCHLORIDE 10 MG/ML
0.5 INJECTION, SOLUTION EPIDURAL; INFILTRATION; INTRACAUDAL; PERINEURAL ONCE AS NEEDED
Status: DISCONTINUED | OUTPATIENT
Start: 2025-02-24 | End: 2025-02-24 | Stop reason: HOSPADM

## 2025-02-24 RX ORDER — SODIUM CHLORIDE 0.9 % (FLUSH) 0.9 %
3 SYRINGE (ML) INJECTION EVERY 12 HOURS SCHEDULED
Status: DISCONTINUED | OUTPATIENT
Start: 2025-02-24 | End: 2025-02-24 | Stop reason: HOSPADM

## 2025-02-24 RX ORDER — SODIUM CHLORIDE 0.9 % (FLUSH) 0.9 %
10 SYRINGE (ML) INJECTION EVERY 12 HOURS SCHEDULED
Status: DISCONTINUED | OUTPATIENT
Start: 2025-02-24 | End: 2025-02-24 | Stop reason: HOSPADM

## 2025-02-24 RX ORDER — OMEPRAZOLE 40 MG/1
40 CAPSULE, DELAYED RELEASE ORAL
Qty: 90 CAPSULE | Refills: 3 | Status: SHIPPED | OUTPATIENT
Start: 2025-02-24

## 2025-02-24 RX ORDER — ONDANSETRON 2 MG/ML
4 INJECTION INTRAMUSCULAR; INTRAVENOUS ONCE AS NEEDED
Status: DISCONTINUED | OUTPATIENT
Start: 2025-02-24 | End: 2025-02-24 | Stop reason: HOSPADM

## 2025-02-24 RX ORDER — SODIUM CHLORIDE 0.9 % (FLUSH) 0.9 %
10 SYRINGE (ML) INJECTION AS NEEDED
Status: DISCONTINUED | OUTPATIENT
Start: 2025-02-24 | End: 2025-02-24 | Stop reason: HOSPADM

## 2025-02-24 RX ORDER — LIDOCAINE HYDROCHLORIDE 20 MG/ML
INJECTION, SOLUTION INFILTRATION; PERINEURAL AS NEEDED
Status: DISCONTINUED | OUTPATIENT
Start: 2025-02-24 | End: 2025-02-24 | Stop reason: SURG

## 2025-02-24 RX ORDER — PROPOFOL 10 MG/ML
INJECTION, EMULSION INTRAVENOUS AS NEEDED
Status: DISCONTINUED | OUTPATIENT
Start: 2025-02-24 | End: 2025-02-24 | Stop reason: SURG

## 2025-02-24 RX ADMIN — LIDOCAINE HYDROCHLORIDE 50 MG: 20 INJECTION, SOLUTION INFILTRATION; PERINEURAL at 09:33

## 2025-02-24 RX ADMIN — LIDOCAINE HYDROCHLORIDE 50 MG: 20 INJECTION, SOLUTION INFILTRATION; PERINEURAL at 09:36

## 2025-02-24 RX ADMIN — PROPOFOL 80 MG: 10 INJECTION, EMULSION INTRAVENOUS at 09:33

## 2025-02-24 RX ADMIN — PROPOFOL 200 MG: 10 INJECTION, EMULSION INTRAVENOUS at 09:43

## 2025-02-24 RX ADMIN — PROPOFOL 100 MG: 10 INJECTION, EMULSION INTRAVENOUS at 09:36

## 2025-02-24 NOTE — ANESTHESIA POSTPROCEDURE EVALUATION
Patient: Timoteo Jimenes    Procedure Summary       Date: 02/24/25 Room / Location: [facility] ENDOSCOPY 2 /  [facility]    Anesthesia Start: 0928 Anesthesia Stop: 1004    Procedures:       ESOPHAGOGASTRODUODENOSCOPY WITH BIOPSY      COLONOSCOPY WITH POLYPECTOMY Diagnosis:       Generalized abdominal pain      Irritable bowel syndrome with diarrhea      Abdominal bloating      Ulcerative proctitis without complication      Encounter for screening colonoscopy      GERD (gastroesophageal reflux disease)      (Generalized abdominal pain [R10.84])      (Irritable bowel syndrome with diarrhea [K58.0])      (Abdominal bloating [R14.0])      (Ulcerative proctitis without complication [K51.20])      (Encounter for screening colonoscopy [Z12.11])      (GERD (gastroesophageal reflux disease) [K21.9])    Surgeons: Obinna Solares MD Provider: Martín Astudillo CRNA    Anesthesia Type: MAC ASA Status: 2            Anesthesia Type: MAC    Vitals  Vitals Value Taken Time   /86 02/24/25 1020   Temp     Pulse 63 02/24/25 1023   Resp     SpO2 98 % 02/24/25 1023   Vitals shown include unfiled device data.        Post Anesthesia Care and Evaluation    Patient location during evaluation: PHASE II  Patient participation: complete - patient participated  Level of consciousness: awake and alert  Pain score: 0  Pain management: adequate    Airway patency: patent  Anesthetic complications: No anesthetic complications  PONV Status: none  Cardiovascular status: acceptable  Respiratory status: acceptable  Hydration status: acceptable

## 2025-02-24 NOTE — H&P
"Patient Care Team:  Tiffanie Carpenter APRN as PCP - General (Family Medicine)    CHIEF COMPLAINT:  GERD, abdominal bloating, and Ulcerative Proctitis.     HISTORY OF PRESENT ILLNESS:  EGD for GERD and abdominal bloating.   C/s for Ulcerative Proctitis.     Past Medical History:   Diagnosis Date    Colon polyp     Depression     Vitamin D deficiency      Past Surgical History:   Procedure Laterality Date    HYSTERECTOMY       Family History   Problem Relation Age of Onset    Heart disease Mother     Hypertension Mother     Diabetes Mother     Kidney disease Mother     Kidney disease Father      Social History     Tobacco Use    Smoking status: Never    Smokeless tobacco: Never   Vaping Use    Vaping status: Never Used   Substance Use Topics    Alcohol use: Yes     Comment: Socailly    Drug use: No     Medications Prior to Admission   Medication Sig Dispense Refill Last Dose/Taking    esomeprazole (nexIUM) 20 MG capsule Take 1 capsule by mouth Daily.   Past Week    ezetimibe (ZETIA) 10 MG tablet Take 1 tablet by mouth Daily.   2/23/2025 at  9:00 AM    NP Thyroid 30 MG tablet Take 1 tablet by mouth Daily.   2/23/2025 at  9:00 AM    Progesterone (PROMETRIUM) 200 MG capsule Take 1 capsule by mouth Daily.   2/22/2025 at 10:00 PM    MULTIPLE VITAMIN PO Take  by mouth.   2/20/2025 at 10:00 PM     Allergies:  Cyclobenzaprine, Tramadol, and Dairycare [bacid]    REVIEW OF SYSTEMS:  Please see the above history of present illness for pertinent positives and negatives.  The remainder of the patient's systems have been reviewed and are negative.     Vital Signs  Temp:  [98 °F (36.7 °C)] 98 °F (36.7 °C)  Heart Rate:  [71] 71  Resp:  [17] 17  BP: (149)/(73) 149/73    Flowsheet Rows      Flowsheet Row First Filed Value   Admission Height 154.9 cm (61\") Documented at 02/24/2025 0853   Admission Weight 78 kg (172 lb) Documented at 02/24/2025 0853             Physical Exam:  Physical Exam   Constitutional: Patient appears well-developed " and well-nourished and in no acute distress   HEENT:   Head: Normocephalic and atraumatic.   Eyes:  Pupils are equal, round, and reactive to light. EOM are intact. Sclerae are anicteric and non-injected.  Mouth and Throat: Patient has moist mucous membranes. Oropharynx is clear of any erythema or exudate.     Neck: Neck supple. No JVD present. No thyromegaly present. No lymphadenopathy present.  Cardiovascular: Regular rate, regular rhythm, S1 normal and S2 normal.  Exam reveals no gallop and no friction rub.  No murmur heard.  Pulmonary/Chest: Lungs are clear to auscultation bilaterally. No respiratory distress. No wheezes. No rhonchi. No rales.   Abdominal: Soft. Bowel sounds are normal. No distension and no mass. There is no hepatosplenomegaly. There is no tenderness.   Musculoskeletal: Normal Muscle tone  Extremities: No edema. Pulses are palpable in all 4 extremities.  Neurological: Patient is alert and oriented to person, place, and time. Cranial nerves II-XII are grossly intact with no focal deficits.  Skin: Skin is warm. No rash noted. Nails show no clubbing.  No cyanosis or erythema.    Debilities/Disabilities Identified: None  Emotional Behavior: Appropriate     Results Review:   I reviewed the patient's new clinical results.    Lab Results (most recent)       None            Imaging Results (Most Recent)       None          reviewed    ECG/EMG Results (most recent)       None          reviewed    Assessment & Plan   GERD, abdominal bloating, and Ulcerative Proctitis. /  EGD and colonoscopy      I discussed the patient's findings and my recommendations with patient.     Obinna Solares MD  02/24/25  09:34 EST    Time: 10 min prior to procedure.

## 2025-02-24 NOTE — ANESTHESIA PREPROCEDURE EVALUATION
Anesthesia Evaluation     Patient summary reviewed and Nursing notes reviewed   no history of anesthetic complications:   NPO Solid Status: > 8 hours  NPO Liquid Status: > 8 hours           Airway   Mallampati: II  TM distance: >3 FB  Neck ROM: full  No difficulty expected  Dental - normal exam     Pulmonary - negative pulmonary ROS and normal exam    breath sounds clear to auscultation  Cardiovascular - normal exam  Exercise tolerance: good (4-7 METS)    Rhythm: regular  Rate: normal    (+) hyperlipidemia      Neuro/Psych- negative ROS  (-) psychiatric history  GI/Hepatic/Renal/Endo    (+) obesity, GERD, thyroid problem hypothyroidism    Musculoskeletal (-) negative ROS    Abdominal  - normal exam   Substance History   (+) alcohol use (once a week socially)     OB/GYN negative ob/gyn ROS         Other - negative ROS                   Anesthesia Plan    ASA 2     MAC     intravenous induction     Anesthetic plan, risks, benefits, and alternatives have been provided, discussed and informed consent has been obtained with: patient.  Pre-procedure education provided  Use of blood products discussed with patient  Consented to blood products.    Plan discussed with CRNA.    CODE STATUS:

## 2025-02-25 LAB
CYTO UR: NORMAL
LAB AP CASE REPORT: NORMAL
PATH REPORT.FINAL DX SPEC: NORMAL
PATH REPORT.GROSS SPEC: NORMAL

## 2025-06-11 ENCOUNTER — TELEMEDICINE (OUTPATIENT)
Dept: GASTROENTEROLOGY | Facility: CLINIC | Age: 50
End: 2025-06-11
Payer: COMMERCIAL

## 2025-06-11 DIAGNOSIS — K58.0 IRRITABLE BOWEL SYNDROME WITH DIARRHEA: Primary | ICD-10-CM

## 2025-06-11 DIAGNOSIS — Z86.0100 HISTORY OF COLON POLYPS: ICD-10-CM

## 2025-06-11 DIAGNOSIS — K51.20 ULCERATIVE PROCTITIS WITHOUT COMPLICATION: ICD-10-CM

## 2025-06-11 NOTE — PATIENT INSTRUCTIONS
Next Colonoscopy for screening recommended at 10 -year interval due 2/24/2035    Per our discussion can consider:    I recommend flushing nasal passages with saline nonmedicated nasal spray twice daily to help reduce postnasal drainage as I suspect this is a component of your persistent belching.    For bloating and fullness     Cira has been helpful for some patients.  Gingerroot capsules can be purchased over-the-counter.    Directions: Cira root 500 mg (or 550 mg) capsules, take 1 to 2 capsules 3 times daily before meals, max 6 capsules per day.

## 2025-06-11 NOTE — PROGRESS NOTES
Chief Complaint   Patient presents with    Heartburn    Follow-up     Postprocedure follow-up             Mode of Visit: Video  Location of patient: -WORK-  Location of provider: [facility]  You have chosen to receive care through a telehealth visit.  The patient has signed the video visit consent form.  The visit included audio and video interaction. No technical issues occurred during this visit.    Patient is a 50 y.o. who presents via telehealth for follow-up after undergoing endoscopic evaluation with Dr. Solares.  Last in office visit completed 1/10/2025 is a new patient to the practice.  Previously followed by outside gastroenterologist Dr. Christensen.  patient has a significant past medical history of ulcerative colitis    2/24/2025 EGD and Colonoscopy:  EGD:  LA grade a esophagitis  Erosive gastritis  Path:(-) IM and HP  Unremarkable duodenal  Transitioned esomeprazole to omeprazole 40 mg   Colonoscopy:  Bowel prep described as good; complete to terminal ileum which was unremarkable  2 mm polyp rectum  Path:TA  No evidence of active colitis throughout  Next Colonoscopy for screening recommended at 10 -year interval due 2/24/2035    Past Surgical History:  Hysterectomy     Social History:   Denies use of tobacco or illicit drugs  Describes social alcohol consumption     Family history:  Colon polyps in sister   ulcerative colitis in a half-sister.   Known family history of colon cancer   History of Present Illness  The patient presents via telemedicine consultation for the evaluation of esophagitis, erosive gastritis, and a rectal polyp.    Esophagitis and Erosive Gastritis  - The patient reports persistent eructation upon consuming water in the morning, despite transitioning from esomeprazole (Nexium) to omeprazole.    Allergic Rhinitis  - The patient experiences postnasal drip attributed to allergic rhinitis.    Gastrointestinal Symptoms  - The initial consultation was prompted by gastrointestinal symptoms  related to food intake.  - The patient adheres to a low FODMAP diet, which has been beneficial.  - They utilize FODZYME for symptomatic relief from specific food triggers.  - A multivitamin prescribed by their functional medicine physician and dietitian combined with completion of Xifaxan regimen has ameliorated symptoms.  - The combination of dietary modifications and the multivitamin has resulted in a reduction in the frequency of bowel movements.  Denies melena or hematochezia.  Denies nocturnal bowel movements.  - The patient reports consuming a meal containing peas, kale, edamame, and garbanzo beans without experiencing pain or flatulence, followed by a normal bowel movement.  - They have completed a course of rifaximin (Xifaxan), which has decreased the frequency and severity of their pain.    SOCIAL HISTORY  - Follows a low FODMAP diet  - Uses FODZYME for food triggers  - Takes a multivitamin recommended by a dietitian      Previous treatment for ulcerative colitis and IBS-D  Canasa suppositories at night-none within the past 15 years  Xifaxan 550 mg 3 times daily x 14 days-initiated 1/10/2025    Current/Previous treatment for GERD  Current:   Dietary modifications   Previous:  12 weeks Omeprazole 40 mg - initiated after 2/24/25 EGD; completed around 5/25/2025  Esomeprazole 20 mg daily    Review of Systems      Result Review :   Office Visit with Mara Monet APRN (01/10/2025)   Upper GI Endoscopy (02/24/2025 09:33)   Colonoscopy (02/24/2025 09:32)   Assessment and Plan    Diagnoses and all orders for this visit:    1. Irritable bowel syndrome with diarrhea (Primary)    2. Ulcerative proctitis without complication    3. History of colon polyps       Assessment & Plan  GERD and erosive gastritis:  -Reviewed EGD findings at length with patient including LA grade a esophagitis and erosive gastritis without evidence of H. pylori.  Expect this to have healed with completed 12-week regimen of omeprazole 40 mg  daily which is since been discontinued without noticeable change in symptoms.  -She does continue to experience persistent belching without identifiable trigger.  We discussed consideration of:  - Cira root supplementation for belching and upper fullness  - Saline nasal sprays twice daily for postnasal drainage contributing to inadvertent swallowing of air with throat clearing.    Rectal polyp:  -Reviewed colonoscopy findings at length which noted a 2 mm tubular adenomatous polyp in the rectum successfully removed.  Thankfully no evidence of active ulcerative proctitis or active inflammation on random biopsies throughout the colon.  - Small precancerous polyp removed during colonoscopy  - Next colonoscopy in 10 years due February 2035    Follow-up:  - 6 months or sooner for any new or worsening GI concerns      Patient or patient representative verbalized consent for the use of Ambient Listening during the visit with  KOFI Camargo for chart documentation. 6/11/2025  08:52 EDT    EMR Dragon/Transcription Disclaimer:  This document has been Dictated utilizing Dragon dictation.

## 2025-08-21 ENCOUNTER — OFFICE VISIT (OUTPATIENT)
Dept: INTERNAL MEDICINE | Facility: CLINIC | Age: 50
End: 2025-08-21
Payer: COMMERCIAL

## 2025-08-21 VITALS
TEMPERATURE: 98.4 F | BODY MASS INDEX: 32.62 KG/M2 | WEIGHT: 172.8 LBS | HEIGHT: 61 IN | DIASTOLIC BLOOD PRESSURE: 74 MMHG | HEART RATE: 74 BPM | OXYGEN SATURATION: 96 % | SYSTOLIC BLOOD PRESSURE: 116 MMHG

## 2025-08-21 DIAGNOSIS — Z00.00 HEALTHCARE MAINTENANCE: Primary | ICD-10-CM

## 2025-08-21 PROCEDURE — 99396 PREV VISIT EST AGE 40-64: CPT | Performed by: NURSE PRACTITIONER

## 2025-08-21 RX ORDER — CETIRIZINE HYDROCHLORIDE 10 MG/1
10 TABLET, CHEWABLE ORAL DAILY
COMMUNITY

## (undated) DEVICE — KT ORCA ORCAPOD DISP STRL

## (undated) DEVICE — SOL IRR H2O BO 1000ML STRL

## (undated) DEVICE — SYR LL W/SCALE/MARK 3ML STRL

## (undated) DEVICE — LINER SURG CANSTR SXN S/RIGD 1500CC

## (undated) DEVICE — ADAPT CLN BIOGUARD AIR/H2O DISP

## (undated) DEVICE — VIAL FORMALIN CAP 10P 40ML

## (undated) DEVICE — BW-412T DISP COMBO CLEANING BRUSH: Brand: SINGLE USE COMBINATION CLEANING BRUSH

## (undated) DEVICE — Device

## (undated) DEVICE — THE BITE BLOCK MAXI, LATEX FREE STRAP IS USED TO PROTECT THE ENDOSCOPE INSERTION TUBE FROM BEING BITTEN BY THE PATIENT.

## (undated) DEVICE — FRCP BX RADJAW4 NDL 2.8 240CM LG OG BX40